# Patient Record
Sex: FEMALE | Race: WHITE | NOT HISPANIC OR LATINO | Employment: FULL TIME | URBAN - METROPOLITAN AREA
[De-identification: names, ages, dates, MRNs, and addresses within clinical notes are randomized per-mention and may not be internally consistent; named-entity substitution may affect disease eponyms.]

---

## 2022-05-27 ENCOUNTER — HOSPITAL ENCOUNTER (EMERGENCY)
Facility: HOSPITAL | Age: 42
Discharge: HOME | End: 2022-05-28
Attending: EMERGENCY MEDICINE
Payer: COMMERCIAL

## 2022-05-27 DIAGNOSIS — D72.829 LEUKOCYTOSIS, UNSPECIFIED TYPE: ICD-10-CM

## 2022-05-27 DIAGNOSIS — R10.9 LEFT SIDED ABDOMINAL PAIN: Primary | ICD-10-CM

## 2022-05-27 LAB
ALBUMIN SERPL BCP-MCNC: 3.4 G/DL (ref 3.5–5.2)
ALP SERPL-CCNC: 70 U/L (ref 55–135)
ALT SERPL W/O P-5'-P-CCNC: 12 U/L (ref 10–44)
ANION GAP SERPL CALC-SCNC: 9 MMOL/L (ref 8–16)
AST SERPL-CCNC: 11 U/L (ref 10–40)
B-HCG UR QL: NEGATIVE
BASOPHILS # BLD AUTO: 0.05 K/UL (ref 0–0.2)
BASOPHILS NFR BLD: 0.3 % (ref 0–1.9)
BILIRUB SERPL-MCNC: 0.8 MG/DL (ref 0.1–1)
BILIRUB UR QL STRIP: NEGATIVE
BUN SERPL-MCNC: 9 MG/DL (ref 6–20)
CALCIUM SERPL-MCNC: 9 MG/DL (ref 8.7–10.5)
CHLORIDE SERPL-SCNC: 104 MMOL/L (ref 95–110)
CLARITY UR: CLEAR
CO2 SERPL-SCNC: 25 MMOL/L (ref 23–29)
COLOR UR: YELLOW
CREAT SERPL-MCNC: 0.7 MG/DL (ref 0.5–1.4)
DIFFERENTIAL METHOD: ABNORMAL
EOSINOPHIL # BLD AUTO: 0.1 K/UL (ref 0–0.5)
EOSINOPHIL NFR BLD: 0.6 % (ref 0–8)
ERYTHROCYTE [DISTWIDTH] IN BLOOD BY AUTOMATED COUNT: 13.8 % (ref 11.5–14.5)
EST. GFR  (AFRICAN AMERICAN): >60 ML/MIN/1.73 M^2
EST. GFR  (NON AFRICAN AMERICAN): >60 ML/MIN/1.73 M^2
GLUCOSE SERPL-MCNC: 116 MG/DL (ref 70–110)
GLUCOSE UR QL STRIP: NEGATIVE
HCT VFR BLD AUTO: 41.4 % (ref 37–48.5)
HGB BLD-MCNC: 13.7 G/DL (ref 12–16)
HGB UR QL STRIP: NEGATIVE
IMM GRANULOCYTES # BLD AUTO: 0.07 K/UL (ref 0–0.04)
IMM GRANULOCYTES NFR BLD AUTO: 0.4 % (ref 0–0.5)
KETONES UR QL STRIP: NEGATIVE
LACTATE SERPL-SCNC: 1.1 MMOL/L (ref 0.5–2.2)
LEUKOCYTE ESTERASE UR QL STRIP: NEGATIVE
LIPASE SERPL-CCNC: 25 U/L (ref 4–60)
LYMPHOCYTES # BLD AUTO: 2.1 K/UL (ref 1–4.8)
LYMPHOCYTES NFR BLD: 12.7 % (ref 18–48)
MCH RBC QN AUTO: 29.1 PG (ref 27–31)
MCHC RBC AUTO-ENTMCNC: 33.1 G/DL (ref 32–36)
MCV RBC AUTO: 88 FL (ref 82–98)
MONOCYTES # BLD AUTO: 2 K/UL (ref 0.3–1)
MONOCYTES NFR BLD: 12.2 % (ref 4–15)
NEUTROPHILS # BLD AUTO: 12.1 K/UL (ref 1.8–7.7)
NEUTROPHILS NFR BLD: 73.8 % (ref 38–73)
NITRITE UR QL STRIP: NEGATIVE
NRBC BLD-RTO: 0 /100 WBC
PH UR STRIP: 8 [PH] (ref 5–8)
PLATELET # BLD AUTO: 278 K/UL (ref 150–450)
PMV BLD AUTO: 9.5 FL (ref 9.2–12.9)
POTASSIUM SERPL-SCNC: 3.7 MMOL/L (ref 3.5–5.1)
PROT SERPL-MCNC: 7.4 G/DL (ref 6–8.4)
PROT UR QL STRIP: NEGATIVE
RBC # BLD AUTO: 4.7 M/UL (ref 4–5.4)
SODIUM SERPL-SCNC: 138 MMOL/L (ref 136–145)
SP GR UR STRIP: 1.01 (ref 1–1.03)
URN SPEC COLLECT METH UR: NORMAL
UROBILINOGEN UR STRIP-ACNC: NEGATIVE EU/DL
WBC # BLD AUTO: 16.35 K/UL (ref 3.9–12.7)

## 2022-05-27 PROCEDURE — 25000003 PHARM REV CODE 250: Performed by: EMERGENCY MEDICINE

## 2022-05-27 PROCEDURE — 85025 COMPLETE CBC W/AUTO DIFF WBC: CPT | Performed by: NURSE PRACTITIONER

## 2022-05-27 PROCEDURE — 63600175 PHARM REV CODE 636 W HCPCS: Performed by: EMERGENCY MEDICINE

## 2022-05-27 PROCEDURE — 83605 ASSAY OF LACTIC ACID: CPT | Performed by: EMERGENCY MEDICINE

## 2022-05-27 PROCEDURE — 96375 TX/PRO/DX INJ NEW DRUG ADDON: CPT

## 2022-05-27 PROCEDURE — 81003 URINALYSIS AUTO W/O SCOPE: CPT | Performed by: NURSE PRACTITIONER

## 2022-05-27 PROCEDURE — 99285 EMERGENCY DEPT VISIT HI MDM: CPT | Mod: 25

## 2022-05-27 PROCEDURE — 81025 URINE PREGNANCY TEST: CPT | Performed by: NURSE PRACTITIONER

## 2022-05-27 PROCEDURE — 83690 ASSAY OF LIPASE: CPT | Performed by: NURSE PRACTITIONER

## 2022-05-27 PROCEDURE — 96361 HYDRATE IV INFUSION ADD-ON: CPT

## 2022-05-27 PROCEDURE — 96374 THER/PROPH/DIAG INJ IV PUSH: CPT

## 2022-05-27 PROCEDURE — 80053 COMPREHEN METABOLIC PANEL: CPT | Performed by: NURSE PRACTITIONER

## 2022-05-27 RX ORDER — LIDOCAINE HYDROCHLORIDE 20 MG/ML
10 SOLUTION OROPHARYNGEAL ONCE
Status: COMPLETED | OUTPATIENT
Start: 2022-05-27 | End: 2022-05-27

## 2022-05-27 RX ORDER — MORPHINE SULFATE 4 MG/ML
4 INJECTION, SOLUTION INTRAMUSCULAR; INTRAVENOUS
Status: COMPLETED | OUTPATIENT
Start: 2022-05-27 | End: 2022-05-27

## 2022-05-27 RX ORDER — ONDANSETRON 2 MG/ML
4 INJECTION INTRAMUSCULAR; INTRAVENOUS
Status: COMPLETED | OUTPATIENT
Start: 2022-05-27 | End: 2022-05-27

## 2022-05-27 RX ORDER — MAG HYDROX/ALUMINUM HYD/SIMETH 200-200-20
30 SUSPENSION, ORAL (FINAL DOSE FORM) ORAL ONCE
Status: COMPLETED | OUTPATIENT
Start: 2022-05-27 | End: 2022-05-27

## 2022-05-27 RX ADMIN — MORPHINE SULFATE 4 MG: 4 INJECTION INTRAVENOUS at 09:05

## 2022-05-27 RX ADMIN — ONDANSETRON 4 MG: 2 INJECTION INTRAMUSCULAR; INTRAVENOUS at 09:05

## 2022-05-27 RX ADMIN — LIDOCAINE HYDROCHLORIDE 10 ML: 20 SOLUTION ORAL; TOPICAL at 10:05

## 2022-05-27 RX ADMIN — SODIUM CHLORIDE 1000 ML: 0.9 INJECTION, SOLUTION INTRAVENOUS at 09:05

## 2022-05-27 RX ADMIN — ALUMINUM HYDROXIDE, MAGNESIUM HYDROXIDE, AND SIMETHICONE 30 ML: 200; 200; 20 SUSPENSION ORAL at 10:05

## 2022-05-28 VITALS
RESPIRATION RATE: 22 BRPM | OXYGEN SATURATION: 99 % | WEIGHT: 293 LBS | DIASTOLIC BLOOD PRESSURE: 84 MMHG | HEART RATE: 91 BPM | BODY MASS INDEX: 45.99 KG/M2 | SYSTOLIC BLOOD PRESSURE: 128 MMHG | HEIGHT: 67 IN | TEMPERATURE: 99 F

## 2022-05-28 PROBLEM — R10.30 LOWER ABDOMINAL PAIN: Status: ACTIVE | Noted: 2022-05-28

## 2022-05-28 LAB
BACTERIA GENITAL QL WET PREP: ABNORMAL
CLUE CELLS VAG QL WET PREP: ABNORMAL
FILAMENT FUNGI VAG WET PREP-#/AREA: ABNORMAL
SPECIMEN SOURCE: ABNORMAL
T VAGINALIS GENITAL QL WET PREP: ABNORMAL
WBC #/AREA VAG WET PREP: ABNORMAL
YEAST GENITAL QL WET PREP: ABNORMAL

## 2022-05-28 PROCEDURE — 87210 SMEAR WET MOUNT SALINE/INK: CPT | Performed by: EMERGENCY MEDICINE

## 2022-05-28 PROCEDURE — 87591 N.GONORRHOEAE DNA AMP PROB: CPT | Performed by: EMERGENCY MEDICINE

## 2022-05-28 PROCEDURE — 96376 TX/PRO/DX INJ SAME DRUG ADON: CPT

## 2022-05-28 PROCEDURE — 99202 OFFICE O/P NEW SF 15 MIN: CPT | Mod: ,,, | Performed by: OBSTETRICS & GYNECOLOGY

## 2022-05-28 PROCEDURE — 99202 PR OFFICE/OUTPT VISIT, NEW, LEVL II, 15-29 MIN: ICD-10-PCS | Mod: ,,, | Performed by: OBSTETRICS & GYNECOLOGY

## 2022-05-28 PROCEDURE — 87491 CHLMYD TRACH DNA AMP PROBE: CPT | Performed by: EMERGENCY MEDICINE

## 2022-05-28 PROCEDURE — 63600175 PHARM REV CODE 636 W HCPCS: Performed by: EMERGENCY MEDICINE

## 2022-05-28 PROCEDURE — 25000003 PHARM REV CODE 250: Performed by: EMERGENCY MEDICINE

## 2022-05-28 RX ORDER — METRONIDAZOLE 500 MG/1
500 TABLET ORAL
Status: COMPLETED | OUTPATIENT
Start: 2022-05-28 | End: 2022-05-28

## 2022-05-28 RX ORDER — AMOXICILLIN AND CLAVULANATE POTASSIUM 875; 125 MG/1; MG/1
1 TABLET, FILM COATED ORAL EVERY 8 HOURS
Qty: 21 TABLET | Refills: 0 | Status: SHIPPED | OUTPATIENT
Start: 2022-05-28 | End: 2022-06-04

## 2022-05-28 RX ORDER — METRONIDAZOLE 500 MG/1
500 TABLET ORAL 3 TIMES DAILY
Qty: 21 TABLET | Refills: 0 | Status: SHIPPED | OUTPATIENT
Start: 2022-05-28 | End: 2022-06-04

## 2022-05-28 RX ORDER — HYDROCODONE BITARTRATE AND ACETAMINOPHEN 7.5; 325 MG/1; MG/1
1 TABLET ORAL EVERY 6 HOURS PRN
Qty: 12 TABLET | Refills: 0 | Status: SHIPPED | OUTPATIENT
Start: 2022-05-28 | End: 2022-10-04

## 2022-05-28 RX ORDER — ONDANSETRON 4 MG/1
4 TABLET, ORALLY DISINTEGRATING ORAL EVERY 6 HOURS PRN
Qty: 15 TABLET | Refills: 0 | Status: SHIPPED | OUTPATIENT
Start: 2022-05-28 | End: 2022-10-04

## 2022-05-28 RX ORDER — MORPHINE SULFATE 4 MG/ML
6 INJECTION, SOLUTION INTRAMUSCULAR; INTRAVENOUS
Status: COMPLETED | OUTPATIENT
Start: 2022-05-28 | End: 2022-05-28

## 2022-05-28 RX ORDER — ONDANSETRON 2 MG/ML
4 INJECTION INTRAMUSCULAR; INTRAVENOUS
Status: COMPLETED | OUTPATIENT
Start: 2022-05-28 | End: 2022-05-28

## 2022-05-28 RX ORDER — AMOXICILLIN AND CLAVULANATE POTASSIUM 875; 125 MG/1; MG/1
1 TABLET, FILM COATED ORAL
Status: COMPLETED | OUTPATIENT
Start: 2022-05-28 | End: 2022-05-28

## 2022-05-28 RX ADMIN — AMOXICILLIN AND CLAVULANATE POTASSIUM 1 TABLET: 875; 125 TABLET, FILM COATED ORAL at 01:05

## 2022-05-28 RX ADMIN — MORPHINE SULFATE 6 MG: 4 INJECTION INTRAVENOUS at 12:05

## 2022-05-28 RX ADMIN — ONDANSETRON 4 MG: 2 INJECTION INTRAMUSCULAR; INTRAVENOUS at 12:05

## 2022-05-28 RX ADMIN — METRONIDAZOLE 500 MG: 500 TABLET ORAL at 01:05

## 2022-05-28 NOTE — ED PROVIDER NOTES
"SCRIBE #1 NOTE: I, Torey Zofia, am scribing for, and in the presence of, Tray Zuñiga MD. I have scribed the entire note.       History     Chief Complaint   Patient presents with    Abdominal Pain     Pain to left flank, across pelvis; pt seen at after hours clinic and was sent here.  Pt had sore throat last week and was prescribed steroid; her throat pain has improved since then.       Review of patient's allergies indicates:   Allergen Reactions    Levaquin [levofloxacin]     Phenergan [promethazine]          History of Present Illness     HPI    5/27/2022, 8:58 PM  History obtained from the patient      History of Present Illness: Tricia Pierre is a 41 y.o. female patient with a PMHx of an MI in 2017 who presents to the Emergency Department for evaluation of LUQ, LLQ abdominal pain and pelvic pain which onset gradually 1 day ago. Pt states she had a mild sore throat and fever that caused her to see her PCP on 05/19/2022 where she tested negative for flu and COVID-19. She reports she was given steroids that provided relief to her sore throat and she treated the episodic fever with ibuprofen. She states her abdominal pain is categorized by fatigue and nausea and described as "sore". She mentions having a fever today at 1100 with a Tmax of 102.6 that resolved with ibuprofen. Symptoms are constant and moderate in severity. No mitigating or exacerbating factors reported. No associated sxs reported. Patient denies any vomiting, diarrhea, dysuria, vaginal bleeding, vaginal discharge, SOB, cough, and all other sxs at this time. No other prior Tx reported. No further complaints or concerns at this time.       Arrival mode: Personal vehicle    PCP: Primary Doctor No        Past Medical History:  No past medical history on file.    Past Surgical History:  No past surgical history on file.      Family History:  No family history on file.    Social History:  Social History     Tobacco Use    Smoking status: Not on file    " Smokeless tobacco: Not on file   Substance and Sexual Activity    Alcohol use: Not on file    Drug use: Not on file    Sexual activity: Not on file        Review of Systems     Review of Systems   Constitutional: Positive for fatigue. Negative for fever.   HENT: Positive for sore throat (Mild).    Respiratory: Negative for cough and shortness of breath.    Cardiovascular: Negative for chest pain.   Gastrointestinal: Positive for abdominal pain (sore) and nausea. Negative for diarrhea and vomiting.   Genitourinary: Positive for pelvic pain. Negative for dysuria, vaginal bleeding and vaginal discharge.   Musculoskeletal: Negative for back pain.   Skin: Negative for rash.   Neurological: Negative for dizziness, weakness and headaches.   Hematological: Does not bruise/bleed easily.   All other systems reviewed and are negative.     Physical Exam     Initial Vitals [05/27/22 1902]   BP Pulse Resp Temp SpO2   (!) 176/86 (!) 113 18 99.9 °F (37.7 °C) 97 %      MAP       --          Physical Exam  Nursing Notes and Vital Signs Reviewed.  Constitutional: Patient is in no acute distress. Increased BMI  Head: Atraumatic. Normocephalic.  Eyes: PERRL. EOM intact. Conjunctivae are not pale. No scleral icterus.  ENT: Mucous membranes are dry. Oropharynx is clear and symmetric.    Neck: Supple. Full ROM. No lymphadenopathy.  Cardiovascular: Tachycardiac. Regular rhythm. No murmurs, rubs, or gallops. Distal pulses are 2+ and symmetric.  Pulmonary/Chest: No respiratory distress. Clear to auscultation bilaterally. No wheezing or rales.  Abdominal: Soft and non-distended.  There is bilateral lower tenderness L>R.  No rebound, guarding, or rigidity.  Pelvic: A female chaperone was present for this examination. Nl external inspection. No lesions or abnormalities were visible on the labia majora or minora. Cervical os is closed. There is no cervical motion tenderness. There is no blood in the vaginal vault. Minimal white discharge  "present. Left adnexal tenderness present. No right adnexal tenderness. No adnexal masses.  Genitourinary: No CVA tenderness  Musculoskeletal: Moves all extremities. No obvious deformities. No edema. No calf tenderness.  Skin: Warm and dry.  Neurological:  Alert, awake, and appropriate.  Normal speech.  No acute focal neurological deficits are appreciated.  Psychiatric: Normal affect. Good eye contact. Appropriate in content.     ED Course   Procedures  ED Vital Signs:  Vitals:    05/27/22 1902 05/27/22 2002 05/27/22 2102 05/27/22 2105   BP: (!) 176/86 (!) 122/58 135/78    Pulse: (!) 113 105 102    Resp: 18   18   Temp: 99.9 °F (37.7 °C)      TempSrc: Oral      SpO2: 97% 97% 96%    Weight: (!) 138.7 kg (305 lb 12.5 oz)      Height: 5' 7" (1.702 m)       05/27/22 2119 05/27/22 2346 05/28/22 0018   BP:  128/84    Pulse:  91    Resp:  19 (!) 22   Temp: 98.6 °F (37 °C)     TempSrc: Oral     SpO2:  99%    Weight:      Height:          Abnormal Lab Results:  Labs Reviewed   VAGINAL SCREEN - Abnormal; Notable for the following components:       Result Value    Clue Cells Rare (*)     WBC - Vaginal Screen Rare (*)     Bacteria - Vaginal Screen Occasional (*)     All other components within normal limits    Narrative:     Release to patient->Immediate   CBC W/ AUTO DIFFERENTIAL - Abnormal; Notable for the following components:    WBC 16.35 (*)     Gran # (ANC) 12.1 (*)     Immature Grans (Abs) 0.07 (*)     Mono # 2.0 (*)     Gran % 73.8 (*)     Lymph % 12.7 (*)     All other components within normal limits   COMPREHENSIVE METABOLIC PANEL - Abnormal; Notable for the following components:    Glucose 116 (*)     Albumin 3.4 (*)     All other components within normal limits   C. TRACHOMATIS/N. GONORRHOEAE BY AMP DNA   LIPASE   URINALYSIS, REFLEX TO URINE CULTURE    Narrative:     Specimen Source->Urine   PREGNANCY TEST, URINE RAPID    Narrative:     Specimen Source->Urine   LACTIC ACID, PLASMA        All Lab Results:  Results for " orders placed or performed during the hospital encounter of 05/27/22   Vaginal Screen    Specimen: Vagina   Result Value Ref Range    Trichomonas None None    Clue Cells Rare (A) None    Budding Yeast None None    Fungal Hyphae None None    WBC - Vaginal Screen Rare (A) None    Bacteria - Vaginal Screen Occasional (A) None    Wet Prep Source Vagina    CBC auto differential   Result Value Ref Range    WBC 16.35 (H) 3.90 - 12.70 K/uL    RBC 4.70 4.00 - 5.40 M/uL    Hemoglobin 13.7 12.0 - 16.0 g/dL    Hematocrit 41.4 37.0 - 48.5 %    MCV 88 82 - 98 fL    MCH 29.1 27.0 - 31.0 pg    MCHC 33.1 32.0 - 36.0 g/dL    RDW 13.8 11.5 - 14.5 %    Platelets 278 150 - 450 K/uL    MPV 9.5 9.2 - 12.9 fL    Immature Granulocytes 0.4 0.0 - 0.5 %    Gran # (ANC) 12.1 (H) 1.8 - 7.7 K/uL    Immature Grans (Abs) 0.07 (H) 0.00 - 0.04 K/uL    Lymph # 2.1 1.0 - 4.8 K/uL    Mono # 2.0 (H) 0.3 - 1.0 K/uL    Eos # 0.1 0.0 - 0.5 K/uL    Baso # 0.05 0.00 - 0.20 K/uL    nRBC 0 0 /100 WBC    Gran % 73.8 (H) 38.0 - 73.0 %    Lymph % 12.7 (L) 18.0 - 48.0 %    Mono % 12.2 4.0 - 15.0 %    Eosinophil % 0.6 0.0 - 8.0 %    Basophil % 0.3 0.0 - 1.9 %    Differential Method Automated    Comprehensive metabolic panel   Result Value Ref Range    Sodium 138 136 - 145 mmol/L    Potassium 3.7 3.5 - 5.1 mmol/L    Chloride 104 95 - 110 mmol/L    CO2 25 23 - 29 mmol/L    Glucose 116 (H) 70 - 110 mg/dL    BUN 9 6 - 20 mg/dL    Creatinine 0.7 0.5 - 1.4 mg/dL    Calcium 9.0 8.7 - 10.5 mg/dL    Total Protein 7.4 6.0 - 8.4 g/dL    Albumin 3.4 (L) 3.5 - 5.2 g/dL    Total Bilirubin 0.8 0.1 - 1.0 mg/dL    Alkaline Phosphatase 70 55 - 135 U/L    AST 11 10 - 40 U/L    ALT 12 10 - 44 U/L    Anion Gap 9 8 - 16 mmol/L    eGFR if African American >60 >60 mL/min/1.73 m^2    eGFR if non African American >60 >60 mL/min/1.73 m^2   Lipase   Result Value Ref Range    Lipase 25 4 - 60 U/L   Urinalysis, Reflex to Urine Culture Urine, Clean Catch    Specimen: Urine   Result Value Ref  Range    Specimen UA Urine, Clean Catch     Color, UA Yellow Yellow, Straw, Araceli    Appearance, UA Clear Clear    pH, UA 8.0 5.0 - 8.0    Specific Gravity, UA 1.015 1.005 - 1.030    Protein, UA Negative Negative    Glucose, UA Negative Negative    Ketones, UA Negative Negative    Bilirubin (UA) Negative Negative    Occult Blood UA Negative Negative    Nitrite, UA Negative Negative    Urobilinogen, UA Negative <2.0 EU/dL    Leukocytes, UA Negative Negative   Pregnancy, urine rapid   Result Value Ref Range    Preg Test, Ur Negative    Lactic acid, plasma   Result Value Ref Range    Lactate (Lactic Acid) 1.1 0.5 - 2.2 mmol/L         Imaging Results:  Imaging Results          US Pelvis Comp with Transvag NON-OB (xpd) (Final result)  Result time 05/27/22 23:09:42   Procedure changed from US Pelvis Complete Non OB     Final result by William Bledsoe MD (05/27/22 23:09:42)                 Impression:      Limited examination due to body habitus.  Fluid in the endometrial canal.    The ovaries were only seen on trans abdominal exam and poorly evaluated due to body habitus.  Color flow could not be documented on the left ovary due to suboptimal imaging and body habitus      Electronically signed by: Rakan Thomas  Date:    05/27/2022  Time:    23:09             Narrative:    EXAMINATION:  US PELVIS COMP WITH TRANSVAG NON-OB (XPD)    CLINICAL HISTORY:  bilateral lower abdominal pain;    TECHNIQUE:  Transabdominal and transvaginal pelvic ultrasound performed.    FINDINGS:  The uterus measures 13.3 x 4.4 x 6.9 cm.  The endometrial stripe measures 4 mm.  Poor study secondary to patient's body size.  Fluid in the cervical canal.  The right ovary measures 4.1 x 3.3 x 3.7 cm and poorly seen but grossly unremarkable.  Vascular flow was seen in the right ovary.    Left ovary measures 3.8 x 2.5 x 2.3 cm in poorly seen.  Evaluation of vascular flow limited by body habitus.    There is no free pelvic fluid.                                CT Abdomen Pelvis  Without Contrast (Final result)  Result time 05/27/22 21:08:40    Final result by William Bledsoe MD (05/27/22 21:08:40)                 Impression:      No acute process.    All CT scans   are performed using dose optimization techniques including the following: automated exposure control; adjustment of the mA and/or kV; use of iterative reconstruction technique.  Dose modulation was employed for ALARA by means of: Automated exposure control; adjustment of the mA and/or kV according to patient size (this includes techniques or standardized protocols for targeted exams where dose is matched to indication/reason for exam; i.e. extremities or head); and/or use of iterative reconstructive technique.      Electronically signed by: Rakan Thomas  Date:    05/27/2022  Time:    21:08             Narrative:    EXAMINATION:  CT ABDOMEN PELVIS WITHOUT CONTRAST    CLINICAL HISTORY:  LLQ abdominal pain;Abdominal pain, acute, nonlocalized;    TECHNIQUE:  Low dose axial images, sagittal and coronal reformations were obtained from the lung bases to the pubic symphysis, Oral contrast was not administered.    COMPARISON:  None    FINDINGS:  Heart: Normal in size as far as seen.  No pericardial effusion as far seen.    Lung Bases: Well aerated, without consolidation or pleural fluid.    Liver: Normal in size and attenuation, with no focal hepatic lesions.    Gallbladder: Status post cholecystectomy.    Bile Ducts: No evidence of dilated ducts.    Pancreas: No mass or peripancreatic fat stranding.    Spleen: Unremarkable.    Adrenals: Unremarkable.    Kidneys/ Ureters: Unremarkable.    Bladder: No evidence of wall thickening.    Reproductive organs: Unremarkable.    GI Tract/Mesentery: No evidence of bowel obstruction or inflammation. No secondary signs of appendicitis.  Submucosal low attenuation in the rectosigmoid colon may relate to old colonic segmental colitis.    Peritoneal Space: No ascites. No free  air.    Retroperitoneum: No significant adenopathy.    Abdominal wall: Small fat containing umbilical hernia.    Vasculature: No aneurysm.    Bones: No acute fracture.                                 The Emergency Provider reviewed the vital signs and test results, which are outlined above.     ED Discussion       1:27 AM: Reassessed pt at this time. Discussed with pt all pertinent ED information and results. Discussed pt dx and plan of tx. Gave pt all f/u and return to the ED instructions. All questions and concerns were addressed at this time. Pt expresses understanding of information and instructions, and is comfortable with plan to discharge. Pt is stable for discharge.    I discussed with patient and/or family/caretaker that evaluation in the ED does not suggest any emergent or life threatening medical conditions requiring immediate intervention beyond what was provided in the ED, and I believe patient is safe for discharge.  Regardless, an unremarkable evaluation in the ED does not preclude the development or presence of a serious of life threatening condition. As such, patient was instructed to return immediately for any worsening or change in current symptoms.      ED Course as of 05/28/22 0217   Fri May 27, 2022   2151 Abdominal CT read as within normal limits.  Abdominal CT had been ordered upfront without contrast due to the severe IV contrast shortage at this time.  I discussed with the radiologist Dr. Thomas.  He reviewed the CT scan again.  I inquired if a repeat CT scan with the IV contrast would help visualize something that could have been missed particularly diverticulitis, but Dr. thomas reviewed and thought that there be no added benefit to repeating CT scan with IV contrast. [DP]   Sat May 28, 2022   0046 OBGYN Dr. Sorensen evaluated patient at bedside and does not suspect ovarian torsion.  States that she will write a note that the patient is cleared from an OBGYN ovarian torsion standpoint [DP]       ED Course User Index  [DP] Tray Zuñiga MD     Medical Decision Making:   Clinical Tests:   Lab Tests: Ordered and Reviewed  Radiological Study: Ordered and Reviewed  Left-sided abdominal pain with leukocytosis.  Of note patient was recently on a prescription steroids, however she did report a fever at home.  Due to the worldwide IV contrast shortage, CT was ordered upfront without IV contrast.  No evidence of diverticulitis or other acute abnormalities.  OBGYN evaluated patient and cleared patient from a ovarian torsion standpoint.  Diverticulitis was high in the differential initially.  With no clear definitive diagnosis, I Will treat patient with diverticulitis antibiotics given her a sided abdominal pain with leukocytosis and reports of fever.  Advised very close follow-up on Monday morning.  ER return precautions provided for any new worsening symptoms.           ED Medication(s):  Medications   sodium chloride 0.9% bolus 1,000 mL (0 mLs Intravenous Stopped 5/27/22 2301)   ondansetron injection 4 mg (4 mg Intravenous Given 5/27/22 2105)   morphine injection 4 mg (4 mg Intravenous Given 5/27/22 2105)   aluminum-magnesium hydroxide-simethicone 200-200-20 mg/5 mL suspension 30 mL (30 mLs Oral Given 5/27/22 2228)     And   LIDOcaine HCl 2% oral solution 10 mL (10 mLs Oral Given 5/27/22 2229)   morphine injection 6 mg (6 mg Intravenous Given 5/28/22 0018)   ondansetron injection 4 mg (4 mg Intravenous Given 5/28/22 0018)   metroNIDAZOLE tablet 500 mg (500 mg Oral Given 5/28/22 0145)   amoxicillin-clavulanate 875-125mg per tablet 1 tablet (1 tablet Oral Given 5/28/22 0145)       Discharge Medication List as of 5/28/2022  1:36 AM      START taking these medications    Details   amoxicillin-clavulanate 875-125mg (AUGMENTIN) 875-125 mg per tablet Take 1 tablet by mouth every 8 (eight) hours. for 7 days, Starting Sat 5/28/2022, Until Sat 6/4/2022, Print      HYDROcodone-acetaminophen (NORCO) 7.5-325 mg per tablet  Take 1 tablet by mouth every 6 (six) hours as needed for Pain., Starting Sat 5/28/2022, Print      metroNIDAZOLE (FLAGYL) 500 MG tablet Take 1 tablet (500 mg total) by mouth 3 (three) times daily. for 7 days, Starting Sat 5/28/2022, Until Sat 6/4/2022, Print      ondansetron (ZOFRAN-ODT) 4 MG TbDL Take 1 tablet (4 mg total) by mouth every 6 (six) hours as needed (nausea)., Starting Sat 5/28/2022, Print              Follow-up Information     [facility] - Emergency Dept..    Specialty: Emergency Medicine  Why: As needed, If symptoms worsen  Contact information:  [address] [facility] [address] [phone]           Follow-up with primary care physician in 2 days.                           Scribe Attestation:   Scribe #1: I performed the above scribed service and the documentation accurately describes the services I performed. I attest to the accuracy of the note.     Attending:   Physician Attestation Statement for Scribe #1: I, Tray Zuñiga MD, personally performed the services described in this documentation, as scribed by Torey Armijo, in my presence, and it is both accurate and complete.           Clinical Impression       ICD-10-CM ICD-9-CM   1. Left sided abdominal pain  R10.9 789.09   2. Leukocytosis, unspecified type  D72.829 288.60       Disposition:   Disposition: Discharged  Condition: Stable         Tray Zuñiga MD  05/28/22 0219

## 2022-05-28 NOTE — HPI
Asked to see pt for pelvic pain  40 y/o reports chronic history of lower pelvic pain; reports current pain lower abdomen for  Past 2 days.  Patient describes pain at bottom of abdomen  Hx of tl; last normal menses 2 wks ago;

## 2022-05-28 NOTE — ASSESSMENT & PLAN NOTE
bilat ovaries normal in size--low probability of torsion  Reassurance given regarding normal sized ovaries  Also if torsing for 2 days; there would not be blood flow to ovary and ovary   Would be edematous and enlarged  Also patient does not have surgical abdomen  Findings discussed with Dr. Zuñiga

## 2022-05-28 NOTE — CONSULTS
[facility] - Emergency Dept.  Obstetrics & Gynecology  Consult Note    Patient Name: Tricia Pierre  MRN: [id number]  Admission Date: 5/27/2022  Hospital Length of Stay: 0 days  Code Status: No Order  Primary Care Provider: Primary Doctor No  Principal Problem: <principal problem not specified>    Consults  Subjective:     Chief Complaint: abdominal pain    History of Present Illness:  Asked to see pt for pelvic pain  42 y/o reports chronic history of lower pelvic pain; reports current pain lower abdomen for  Past 2 days.  Patient describes pain at bottom of abdomen  Hx of tl; last normal menses 2 wks ago;           OB History   No obstetric history on file.     No past medical history on file.  No past surgical history on file.    (Not in a hospital admission)      Review of patient's allergies indicates:   Allergen Reactions    Levaquin [levofloxacin]     Phenergan [promethazine]         Family History    None       Tobacco Use    Smoking status: Not on file    Smokeless tobacco: Not on file   Substance and Sexual Activity    Alcohol use: Not on file    Drug use: Not on file    Sexual activity: Not on file     Review of Systems   Gastrointestinal:  Positive for abdominal pain, constipation and diarrhea.   Genitourinary:  Positive for pelvic pain.   All other systems reviewed and are negative.   Objective:     Vital Signs (Most Recent):  Temp: 98.6 °F (37 °C) (05/27/22 2119)  Pulse: 91 (05/27/22 2346)  Resp: (!) 22 (05/28/22 0018)  BP: 128/84 (05/27/22 2346)  SpO2: 99 % (05/27/22 2346)   Vital Signs (24h Range):  Temp:  [98.6 °F (37 °C)-99.9 °F (37.7 °C)] 98.6 °F (37 °C)  Pulse:  [] 91  Resp:  [18-22] 22  SpO2:  [96 %-99 %] 99 %  BP: (122-176)/(58-86) 128/84     Weight: (!) 138.7 kg (305 lb 12.5 oz)  Body mass index is 47.89 kg/m².    No LMP recorded.    Physical Exam:   Constitutional: She appears well-developed.     Eyes: Pupils are equal, round, and reactive to light. Conjunctivae and EOM are normal.       Pulmonary/Chest: Effort normal. Right breast exhibits no mass, no nipple discharge, no skin change, no tenderness and presence. Left breast exhibits no mass, no nipple discharge, no skin change, no tenderness and presence. Breasts are symmetrical.        Abdominal: Soft.     Genitourinary:    Vagina and uterus normal.      Pelvic exam was performed with patient supine.   Labial bartholins normal.          Musculoskeletal: Normal range of motion.       Neurological: She is alert.    Skin: Skin is warm.    Psychiatric: She has a normal mood and affect.   No cmt, no adnexa tenderness,   Abdomen--No rebound, no guarding  Laboratory:  Recent Lab Results         05/28/22  0015   05/27/22 2106 05/27/22 2024 05/27/22 2016        Albumin     3.4         Alkaline Phosphatase     70         ALT     12         Anion Gap     9         Appearance, UA       Clear       AST     11         Bacteria - Vaginal Screen Occasional             Baso #     0.05         Basophil %     0.3         Bilirubin (UA)       Negative       BILIRUBIN TOTAL     0.8  Comment: For infants and newborns, interpretation of results should be based  on gestational age, weight and in agreement with clinical  observations.    Premature Infant recommended reference ranges:  Up to 24 hours.............<8.0 mg/dL  Up to 48 hours............<12.0 mg/dL  3-5 days..................<15.0 mg/dL  6-29 days.................<15.0 mg/dL           Budding Yeast None             BUN     9         Calcium     9.0         Chloride     104         Clue Cells, Wet Prep Rare             CO2     25         Color, UA       Yellow       Creatinine     0.7         Differential Method     Automated         eGFR if      >60         eGFR if non      >60  Comment: Calculation used to obtain the estimated glomerular filtration  rate (eGFR) is the CKD-EPI equation.            Eos #     0.1         Eosinophil %     0.6         Fungal Hyphae None              Glucose     116         Glucose, UA       Negative       Gran # (ANC)     12.1         Gran %     73.8         Hematocrit     41.4         Hemoglobin     13.7         Immature Grans (Abs)     0.07  Comment: Mild elevation in immature granulocytes is non specific and   can be seen in a variety of conditions including stress response,   acute inflammation, trauma and pregnancy. Correlation with other   laboratory and clinical findings is essential.           Immature Granulocytes     0.4         Ketones, UA       Negative       Lactate, Hany   1.1  Comment: Falsely low lactic acid results can be found in samples   containing >=13.0 mg/dL total bilirubin and/or >=3.5 mg/dL   direct bilirubin.             Leukocytes, UA       Negative       Lipase     25         Lymph #     2.1         Lymph %     12.7         MCH     29.1         MCHC     33.1         MCV     88         Mono #     2.0         Mono %     12.2         MPV     9.5         NITRITE UA       Negative       nRBC     0         Occult Blood UA       Negative       pH, UA       8.0       Platelets     278         Potassium     3.7         Preg Test, Ur       Negative       PROTEIN TOTAL     7.4         Protein, UA       Negative  Comment: Recommend a 24 hour urine protein or a urine   protein/creatinine ratio if globulin induced proteinuria is  clinically suspected.         RBC     4.70         RDW     13.8         Sodium     138         Specific [place], UA       1.015       Specimen UA       Urine, Clean Catch       Trichomonas None             UROBILINOGEN UA       Negative       WBC - Vaginal Screen Rare             Wet Prep Source Vagina             WBC     16.35               I have personallly reviewed all pertinent lab results from the last 24 hours.    Diagnostic Results:  US: Reviewed  CT: Reviewed    Assessment/Plan:     Lower abdominal pain  bilat ovaries normal in size--low probability of torsion  Reassurance given regarding normal sized  ovaries  Also if torsing for 2 days; there would not be blood flow to ovary and ovary   Would be edematous and enlarged  Also patient does not have surgical abdomen  Findings discussed with Dr. Zuñiga            Thank you for your consult. I will sign off. Please contact us if you have any additional questions.    Twila Sorensen MD  Obstetrics & Gynecology  [facility] - Emergency Dept.

## 2022-05-28 NOTE — SUBJECTIVE & OBJECTIVE
OB History   No obstetric history on file.     No past medical history on file.  No past surgical history on file.    (Not in a hospital admission)      Review of patient's allergies indicates:   Allergen Reactions    Levaquin [levofloxacin]     Phenergan [promethazine]         Family History    None       Tobacco Use    Smoking status: Not on file    Smokeless tobacco: Not on file   Substance and Sexual Activity    Alcohol use: Not on file    Drug use: Not on file    Sexual activity: Not on file     Review of Systems   Gastrointestinal:  Positive for abdominal pain, constipation and diarrhea.   Genitourinary:  Positive for pelvic pain.   All other systems reviewed and are negative.   Objective:     Vital Signs (Most Recent):  Temp: 98.6 °F (37 °C) (05/27/22 2119)  Pulse: 91 (05/27/22 2346)  Resp: (!) 22 (05/28/22 0018)  BP: 128/84 (05/27/22 2346)  SpO2: 99 % (05/27/22 2346)   Vital Signs (24h Range):  Temp:  [98.6 °F (37 °C)-99.9 °F (37.7 °C)] 98.6 °F (37 °C)  Pulse:  [] 91  Resp:  [18-22] 22  SpO2:  [96 %-99 %] 99 %  BP: (122-176)/(58-86) 128/84     Weight: (!) 138.7 kg (305 lb 12.5 oz)  Body mass index is 47.89 kg/m².    No LMP recorded.    Physical Exam:   Constitutional: She appears well-developed.     Eyes: Pupils are equal, round, and reactive to light. Conjunctivae and EOM are normal.      Pulmonary/Chest: Effort normal. Right breast exhibits no mass, no nipple discharge, no skin change, no tenderness and presence. Left breast exhibits no mass, no nipple discharge, no skin change, no tenderness and presence. Breasts are symmetrical.        Abdominal: Soft.     Genitourinary:    Vagina and uterus normal.      Pelvic exam was performed with patient supine.   Labial bartholins normal.          Musculoskeletal: Normal range of motion.       Neurological: She is alert.    Skin: Skin is warm.    Psychiatric: She has a normal mood and affect.   No cmt, no adnexa tenderness,   Abdomen--No rebound, no  [facility]  Laboratory:  Recent Lab Results         05/28/22  0015   05/27/22 2106 05/27/22 2024 05/27/22 2016        Albumin     3.4         Alkaline Phosphatase     70         ALT     12         Anion Gap     9         Appearance, UA       Clear       AST     11         Bacteria - Vaginal Screen Occasional             Baso #     0.05         Basophil %     0.3         Bilirubin (UA)       Negative       BILIRUBIN TOTAL     0.8  Comment: For infants and newborns, interpretation of results should be based  on gestational age, weight and in agreement with clinical  observations.    Premature Infant recommended reference ranges:  Up to 24 hours.............<8.0 mg/dL  Up to 48 hours............<12.0 mg/dL  3-5 days..................<15.0 mg/dL  6-29 days.................<15.0 mg/dL           Budding Yeast None             BUN     9         Calcium     9.0         Chloride     104         Clue Cells, Wet Prep Rare             CO2     25         Color, UA       Yellow       Creatinine     0.7         Differential Method     Automated         eGFR if      >60         eGFR if non      >60  Comment: Calculation used to obtain the estimated glomerular filtration  rate (eGFR) is the CKD-EPI equation.            Eos #     0.1         Eosinophil %     0.6         Fungal Hyphae None             Glucose     116         Glucose, UA       Negative       Gran # (ANC)     12.1         Gran %     73.8         Hematocrit     41.4         Hemoglobin     13.7         Immature Grans (Abs)     0.07  Comment: Mild elevation in immature granulocytes is non specific and   can be seen in a variety of conditions including stress response,   acute inflammation, trauma and pregnancy. Correlation with other   laboratory and clinical findings is essential.           Immature Granulocytes     0.4         Ketones, UA       Negative       Lactate, Hany   1.1  Comment: Falsely low lactic acid results can be found in  samples   containing >=13.0 mg/dL total bilirubin and/or >=3.5 mg/dL   direct bilirubin.             Leukocytes, UA       Negative       Lipase     25         Lymph #     2.1         Lymph %     12.7         MCH     29.1         MCHC     33.1         MCV     88         Mono #     2.0         Mono %     12.2         MPV     9.5         NITRITE UA       Negative       nRBC     0         Occult Blood UA       Negative       pH, UA       8.0       Platelets     278         Potassium     3.7         Preg Test, Ur       Negative       PROTEIN TOTAL     7.4         Protein, UA       Negative  Comment: Recommend a 24 hour urine protein or a urine   protein/creatinine ratio if globulin induced proteinuria is  clinically suspected.         RBC     4.70         RDW     13.8         Sodium     138         Specific Randall, UA       1.015       Specimen UA       Urine, Clean Catch       Trichomonas None             UROBILINOGEN UA       Negative       WBC - Vaginal Screen Rare             Wet Prep Source Vagina             WBC     16.35               I have personallly reviewed all pertinent lab results from the last 24 hours.    Diagnostic Results:  US: Reviewed  CT: Reviewed

## 2022-05-28 NOTE — FIRST PROVIDER EVALUATION
"Medical screening exam completed.  I have conducted a focused provider triage encounter, findings are as follows:    Brief history of present illness:  Patient complains of left-sided abdominal pain radiating over to the midline    Vitals:    05/27/22 1902   BP: (!) 176/86   BP Location: Right arm   Patient Position: Sitting   Pulse: (!) 113   Resp: 18   Temp: 99.9 °F (37.7 °C)  Comment: pt took ibuprofen at 1100 today   TempSrc: Oral   SpO2: 97%   Weight: (!) 138.7 kg (305 lb 12.5 oz)   Height: 5' 7" (1.702 m)       Pertinent physical exam:  Patient appears to be very uncomfortable    Brief workup plan:  Abdominal workup    Preliminary workup initiated; this workup will be continued and followed by the physician or advanced practice provider that is assigned to the patient when roomed.  "

## 2022-05-30 LAB
C TRACH DNA SPEC QL NAA+PROBE: NOT DETECTED
N GONORRHOEA DNA SPEC QL NAA+PROBE: NOT DETECTED

## 2022-10-04 ENCOUNTER — OFFICE VISIT (OUTPATIENT)
Dept: PRIMARY CARE CLINIC | Facility: CLINIC | Age: 42
End: 2022-10-04
Payer: COMMERCIAL

## 2022-10-04 VITALS
SYSTOLIC BLOOD PRESSURE: 132 MMHG | BODY MASS INDEX: 45.99 KG/M2 | OXYGEN SATURATION: 100 % | HEART RATE: 99 BPM | WEIGHT: 293 LBS | HEIGHT: 67 IN | DIASTOLIC BLOOD PRESSURE: 84 MMHG | TEMPERATURE: 98 F

## 2022-10-04 DIAGNOSIS — I25.2 HISTORY OF NON-ST ELEVATION MYOCARDIAL INFARCTION (NSTEMI): ICD-10-CM

## 2022-10-04 DIAGNOSIS — M54.9 DORSALGIA, UNSPECIFIED: Primary | ICD-10-CM

## 2022-10-04 DIAGNOSIS — M46.97 INFLAMMATORY SPONDYLOPATHY OF LUMBOSACRAL REGION: ICD-10-CM

## 2022-10-04 DIAGNOSIS — Z00.00 ENCOUNTER FOR ANNUAL HEALTH EXAMINATION: ICD-10-CM

## 2022-10-04 DIAGNOSIS — Z11.59 ENCOUNTER FOR HEPATITIS C SCREENING TEST FOR LOW RISK PATIENT: ICD-10-CM

## 2022-10-04 DIAGNOSIS — K92.1 HEMATOCHEZIA: ICD-10-CM

## 2022-10-04 DIAGNOSIS — K92.1 MELENA: ICD-10-CM

## 2022-10-04 DIAGNOSIS — Z23 NEED FOR TDAP VACCINATION: ICD-10-CM

## 2022-10-04 DIAGNOSIS — G89.29 CHRONIC BILATERAL LOW BACK PAIN WITHOUT SCIATICA: ICD-10-CM

## 2022-10-04 DIAGNOSIS — G50.0 TRIGEMINAL NEURALGIA: ICD-10-CM

## 2022-10-04 DIAGNOSIS — Z13.220 ENCOUNTER FOR LIPID SCREENING FOR CARDIOVASCULAR DISEASE: ICD-10-CM

## 2022-10-04 DIAGNOSIS — E66.01 MORBID OBESITY: ICD-10-CM

## 2022-10-04 DIAGNOSIS — Z23 INFLUENZA VACCINE NEEDED: ICD-10-CM

## 2022-10-04 DIAGNOSIS — Z13.6 ENCOUNTER FOR LIPID SCREENING FOR CARDIOVASCULAR DISEASE: ICD-10-CM

## 2022-10-04 DIAGNOSIS — M54.50 CHRONIC BILATERAL LOW BACK PAIN WITHOUT SCIATICA: ICD-10-CM

## 2022-10-04 PROBLEM — R10.30 LOWER ABDOMINAL PAIN: Status: RESOLVED | Noted: 2022-05-28 | Resolved: 2022-10-04

## 2022-10-04 PROCEDURE — 90471 FLU VACCINE (QUAD) GREATER THAN OR EQUAL TO 3YO PRESERVATIVE FREE IM: ICD-10-PCS | Mod: S$GLB,,, | Performed by: INTERNAL MEDICINE

## 2022-10-04 PROCEDURE — 3008F BODY MASS INDEX DOCD: CPT | Mod: CPTII,S$GLB,, | Performed by: INTERNAL MEDICINE

## 2022-10-04 PROCEDURE — 3075F PR MOST RECENT SYSTOLIC BLOOD PRESS GE 130-139MM HG: ICD-10-PCS | Mod: CPTII,S$GLB,, | Performed by: INTERNAL MEDICINE

## 2022-10-04 PROCEDURE — 1159F MED LIST DOCD IN RCRD: CPT | Mod: CPTII,S$GLB,, | Performed by: INTERNAL MEDICINE

## 2022-10-04 PROCEDURE — 99204 OFFICE O/P NEW MOD 45 MIN: CPT | Mod: 25,S$GLB,, | Performed by: INTERNAL MEDICINE

## 2022-10-04 PROCEDURE — 1159F PR MEDICATION LIST DOCUMENTED IN MEDICAL RECORD: ICD-10-PCS | Mod: CPTII,S$GLB,, | Performed by: INTERNAL MEDICINE

## 2022-10-04 PROCEDURE — 90471 IMMUNIZATION ADMIN: CPT | Mod: S$GLB,,, | Performed by: INTERNAL MEDICINE

## 2022-10-04 PROCEDURE — 3008F PR BODY MASS INDEX (BMI) DOCUMENTED: ICD-10-PCS | Mod: CPTII,S$GLB,, | Performed by: INTERNAL MEDICINE

## 2022-10-04 PROCEDURE — 3079F PR MOST RECENT DIASTOLIC BLOOD PRESSURE 80-89 MM HG: ICD-10-PCS | Mod: CPTII,S$GLB,, | Performed by: INTERNAL MEDICINE

## 2022-10-04 PROCEDURE — 90472 IMMUNIZATION ADMIN EACH ADD: CPT | Mod: S$GLB,,, | Performed by: INTERNAL MEDICINE

## 2022-10-04 PROCEDURE — 99204 PR OFFICE/OUTPT VISIT, NEW, LEVL IV, 45-59 MIN: ICD-10-PCS | Mod: 25,S$GLB,, | Performed by: INTERNAL MEDICINE

## 2022-10-04 PROCEDURE — 90715 TDAP VACCINE 7 YRS/> IM: CPT | Mod: S$GLB,,, | Performed by: INTERNAL MEDICINE

## 2022-10-04 PROCEDURE — 90472 TDAP VACCINE GREATER THAN OR EQUAL TO 7YO IM: ICD-10-PCS | Mod: S$GLB,,, | Performed by: INTERNAL MEDICINE

## 2022-10-04 PROCEDURE — 99999 PR PBB SHADOW E&M-EST. PATIENT-LVL V: ICD-10-PCS | Mod: PBBFAC,,, | Performed by: INTERNAL MEDICINE

## 2022-10-04 PROCEDURE — 90715 TDAP VACCINE GREATER THAN OR EQUAL TO 7YO IM: ICD-10-PCS | Mod: S$GLB,,, | Performed by: INTERNAL MEDICINE

## 2022-10-04 PROCEDURE — 3079F DIAST BP 80-89 MM HG: CPT | Mod: CPTII,S$GLB,, | Performed by: INTERNAL MEDICINE

## 2022-10-04 PROCEDURE — 3075F SYST BP GE 130 - 139MM HG: CPT | Mod: CPTII,S$GLB,, | Performed by: INTERNAL MEDICINE

## 2022-10-04 PROCEDURE — 90686 IIV4 VACC NO PRSV 0.5 ML IM: CPT | Mod: S$GLB,,, | Performed by: INTERNAL MEDICINE

## 2022-10-04 PROCEDURE — 99999 PR PBB SHADOW E&M-EST. PATIENT-LVL V: CPT | Mod: PBBFAC,,, | Performed by: INTERNAL MEDICINE

## 2022-10-04 PROCEDURE — 90686 FLU VACCINE (QUAD) GREATER THAN OR EQUAL TO 3YO PRESERVATIVE FREE IM: ICD-10-PCS | Mod: S$GLB,,, | Performed by: INTERNAL MEDICINE

## 2022-10-04 RX ORDER — IBUPROFEN 200 MG
200 TABLET ORAL EVERY 6 HOURS PRN
Status: ON HOLD | COMMUNITY
End: 2022-11-15 | Stop reason: HOSPADM

## 2022-10-04 RX ORDER — PREGABALIN 50 MG/1
50 CAPSULE ORAL 3 TIMES DAILY
Qty: 90 CAPSULE | Refills: 6 | Status: SHIPPED | OUTPATIENT
Start: 2022-10-04 | End: 2023-06-19 | Stop reason: SDUPTHER

## 2022-10-04 RX ORDER — PANTOPRAZOLE SODIUM 40 MG/1
40 TABLET, DELAYED RELEASE ORAL 2 TIMES DAILY
Qty: 60 TABLET | Refills: 0 | Status: ON HOLD | OUTPATIENT
Start: 2022-10-04 | End: 2022-11-15 | Stop reason: SDUPTHER

## 2022-10-04 RX ORDER — TIZANIDINE 4 MG/1
4 TABLET ORAL EVERY 6 HOURS PRN
Qty: 30 TABLET | Refills: 1 | Status: SHIPPED | OUTPATIENT
Start: 2022-10-04 | End: 2022-10-14

## 2022-10-04 NOTE — PROGRESS NOTES
Assessment/Plan:    Problem List Items Addressed This Visit          Neuro    Trigeminal neuralgia    Overview     -previously followed by ENT  -MRI at time of diagnosis wnl  -tried gabapentin with no relief; no other treatment attempted            Cardiac/Vascular    History of non-ST elevation myocardial infarction (NSTEMI)    Overview     -hx of chest pain and elevated troponin in 2017  -[facility] showing clean coronaries; diagnosed with coronary artery spasm  -was on calcium channel blocker but no longer taking  -denies recurrent angina            Endocrine    Morbid obesity    Overview     General weight loss/lifestyle modification strategies discussed (elicit support from others; identify saboteurs; non-food rewards, etc).  Informal exercise measures discussed, e.g. taking stairs instead of elevator.  Regular aerobic exercise program discussed.  Medication: will likely start trial of GLP1a.  Checking labs, including thyroid studies and insulin           Relevant Orders    TSH    INSULIN, RANDOM       Orthopedic    Chronic bilateral low back pain without sciatica    Overview     -chronic hx of back pain with radiculopathy  -completed conservative management, including PT, but with no improvement of symptoms  -plan to proceed with MRI  -taking NSAID multiple times daily and now with concerns about GI bleed  -instructed to stop NSAID use. Start PRN tylenol and start trial of lyrica (tried gabapentin in past but with no benefit)         RESOLVED: Inflammatory spondylopathy of lumbosacral region     Other Visit Diagnoses       Dorsalgia, unspecified    -  Primary    Relevant Medications    pregabalin (LYRICA) 50 MG capsule    tiZANidine (ZANAFLEX) 4 MG tablet    Other Relevant Orders    MRI Lumbar Spine Without Contrast    Hematochezia      -hx of intermittent episodes of hematochezia and melena  -recent episode of suspected diverticulitis; tx with abx and resolution of symptoms  -no evaluation of bleeding to date  -take  large amount of NSAIDs for back; counseled on stopping NSAID use  -start daily PPI  -EGD and c-scope ordered  -checking cbc and iron studies    Relevant Orders    CBC Without Differential    Iron and TIBC    Ferritin    Ambulatory referral/consult to Endo Procedure     Melena        Relevant Orders    Ambulatory referral/consult to Endo Procedure     Encounter for annual health examination        Relevant Orders    CBC Without Differential    Comprehensive Metabolic Panel    Lipid Panel    TSH    Encounter for hepatitis C screening test for low risk patient        Relevant Orders    Hepatitis C Antibody    Encounter for lipid screening for cardiovascular disease        Relevant Orders    Lipid Panel    Influenza vaccine needed        Relevant Orders    Influenza - Quadrivalent (PF) (Completed)    Need for Tdap vaccination        Relevant Orders    Tdap Vaccine (Completed)            Follow up in about 6 months (around 4/4/2023), or if symptoms worsen or fail to improve.    Norma Alcantar MD  ______________________________________________________________________________________________________________________________    CC: Establish care    HPI:    Patient is a new patient to me here to establish care. Patient is a 41 yo WF with a PMH of NSTEMI, coronary spasm, trigeminal neuralgia and back pain.    Previous PCP: Gary    Back pain: Pain at bilateral lower back. Present for >5 years. Sometimes with radiation up spine and down both legs. Having numbness of both legs. Worse with prolonged standing. Sometimes having weakness in legs as well. Taking ibuprofen with some benefit but having to take this daily for relief. Completed PT with no benefit. She has never had any kind of imaging. Tried gabapentin in past but with no relief. She has had no other treatment.    Melena/hematochezia: reports both bright red blood and coffee ground appearance of stool intermittently for years. Occurs spontaneously  every few months. Several months ago, presented to the ER with abdominal pain and fever. Leukocytosis on labs, CT without contrast with area suspicious for colitis. Treated with antibiotics and had resolution of symptoms. She has not had a recurrence of symptoms since then. She had never had any symptoms like this prior to this event. She has never had a colonoscopy. Again, she has been taking a large amount of NSAIDs for back pain. Twice, sometimes 3 times daily. Denies hx of heavy EtOH use.    No other new complaints today.  Remaining chronic conditions have been reviewed and remain stable. Further detail as stated above.      HM reviewed. Labs ordered. Flu and tdap vaccine today. MMG through GYN.    Past Medical History:  Past Medical History:   Diagnosis Date    Ankylosing spondylitis of unspecified sites in spine     Diverticulitis     Heart attack     Microvascular angina     Trigeminal neuralgia      Past Surgical History:   Procedure Laterality Date     SECTION  , ,     CHOLECYSTECTOMY  2002    TUBAL LIGATION       Review of patient's allergies indicates:   Allergen Reactions    Levaquin [levofloxacin]     Phenergan [promethazine]      Social History     Tobacco Use    Smoking status: Never    Smokeless tobacco: Never   Substance Use Topics    Alcohol use: Yes     Alcohol/week: 2.0 standard drinks     Types: 2 Glasses of wine per week    Drug use: Never     Family History   Problem Relation Age of Onset    Arthritis Mother     Diabetes Father     Heart disease Father 45    Hypertension Father     Cancer Paternal Aunt     Heart disease Paternal Aunt 48    Cancer Paternal Aunt     Mental retardation Paternal Aunt     Heart disease Paternal Uncle      Current Outpatient Medications on File Prior to Visit   Medication Sig Dispense Refill    ibuprofen (ADVIL,MOTRIN) 200 MG tablet Take 200 mg by mouth every 6 (six) hours as needed for Pain.      multivitamin capsule Take 1 capsule by mouth  "once daily.      [DISCONTINUED] HYDROcodone-acetaminophen (NORCO) 7.5-325 mg per tablet Take 1 tablet by mouth every 6 (six) hours as needed for Pain. 12 tablet 0    [DISCONTINUED] ondansetron (ZOFRAN-ODT) 4 MG TbDL Take 1 tablet (4 mg total) by mouth every 6 (six) hours as needed (nausea). 15 tablet 0     No current facility-administered medications on file prior to visit.       Review of Systems   Constitutional:  Negative for activity change, chills, diaphoresis, fatigue, fever and unexpected weight change.   HENT:  Negative for congestion, ear pain, hearing loss, postnasal drip, rhinorrhea, sinus pain, sore throat and trouble swallowing.    Eyes:  Negative for pain, discharge, redness and visual disturbance.   Respiratory:  Negative for cough, chest tightness, shortness of breath and wheezing.    Cardiovascular:  Negative for chest pain, palpitations and leg swelling.   Gastrointestinal:  Positive for blood in stool. Negative for abdominal pain, constipation, diarrhea, nausea and vomiting.   Endocrine: Negative for polydipsia and polyuria.   Genitourinary:  Negative for difficulty urinating, dysuria, hematuria and menstrual problem.   Musculoskeletal:  Positive for arthralgias, back pain and joint swelling. Negative for neck pain.   Skin:  Negative for rash.   Neurological:  Positive for numbness and headaches. Negative for dizziness, syncope and weakness.   Psychiatric/Behavioral:  Negative for confusion and dysphoric mood. The patient is not nervous/anxious.      Vitals:    10/04/22 1342   BP: 132/84   Pulse: 99   Temp: 98.2 °F (36.8 °C)   SpO2: 100%   Weight: (!) 136.4 kg (300 lb 9.6 oz)   Height: 5' 7" (1.702 m)       Wt Readings from Last 3 Encounters:   10/04/22 (!) 136.4 kg (300 lb 9.6 oz)   05/27/22 (!) 138.7 kg (305 lb 12.5 oz)       Physical Exam  Constitutional:       General: She is not in acute distress.     Appearance: Normal appearance. She is well-developed. She is obese.   HENT:      Head: " Normocephalic and atraumatic.   Eyes:      Conjunctiva/sclera: Conjunctivae normal.   Cardiovascular:      Rate and Rhythm: Normal rate and regular rhythm.      Pulses: Normal pulses.      Heart sounds: Normal heart sounds. No murmur heard.  Pulmonary:      Effort: Pulmonary effort is normal. No respiratory distress.      Breath sounds: Normal breath sounds.   Abdominal:      General: Bowel sounds are normal. There is no distension.      Palpations: Abdomen is soft.      Tenderness: There is no abdominal tenderness.   Musculoskeletal:         General: Normal range of motion.      Cervical back: Normal range of motion and neck supple.   Skin:     General: Skin is warm and dry.      Findings: No rash.   Neurological:      General: No focal deficit present.      Mental Status: She is alert and oriented to person, place, and time.      Sensory: Sensory deficit present.      Motor: No weakness.      Gait: Gait normal.      Comments: Numbness of bilateral anterior thighs   Psychiatric:         Mood and Affect: Mood normal.         Behavior: Behavior normal.       Health Maintenance   Topic Date Due    Hepatitis C Screening  Never done    Lipid Panel  Never done    Mammogram  Never done    High Dose Statin  Never done    TETANUS VACCINE  10/04/2032

## 2022-10-04 NOTE — Clinical Note
Please let patient know that I sent in a prescription for protonix twice daily until she can have endoscopy some. This is to lower acid in case she does have some kind of ulcer

## 2022-10-05 ENCOUNTER — TELEPHONE (OUTPATIENT)
Dept: FAMILY MEDICINE | Facility: CLINIC | Age: 42
End: 2022-10-05
Payer: COMMERCIAL

## 2022-10-05 NOTE — TELEPHONE ENCOUNTER
----- Message from Norma Alcantar MD sent at 10/4/2022  3:24 PM CDT -----  Please let patient know that I sent in a prescription for protonix twice daily until she can have endoscopy some. This is to lower acid in case she does have some kind of ulcer

## 2022-10-07 ENCOUNTER — HOSPITAL ENCOUNTER (OUTPATIENT)
Dept: PREADMISSION TESTING | Facility: HOSPITAL | Age: 42
Discharge: HOME | End: 2022-10-07
Attending: INTERNAL MEDICINE
Payer: COMMERCIAL

## 2022-10-07 DIAGNOSIS — K92.1 MELENA: ICD-10-CM

## 2022-10-07 DIAGNOSIS — K92.1 HEMATOCHEZIA: Primary | ICD-10-CM

## 2022-10-07 RX ORDER — POLYETHYLENE GLYCOL 3350, SODIUM SULFATE ANHYDROUS, SODIUM BICARBONATE, SODIUM CHLORIDE, POTASSIUM CHLORIDE 236; 22.74; 6.74; 5.86; 2.97 G/4L; G/4L; G/4L; G/4L; G/4L
4 POWDER, FOR SOLUTION ORAL ONCE
Qty: 4000 ML | Refills: 0 | Status: SHIPPED | OUTPATIENT
Start: 2022-10-07 | End: 2022-10-07

## 2022-10-12 DIAGNOSIS — Z12.31 OTHER SCREENING MAMMOGRAM: ICD-10-CM

## 2022-10-17 ENCOUNTER — HOSPITAL ENCOUNTER (OUTPATIENT)
Dept: RADIOLOGY | Facility: HOSPITAL | Age: 42
Discharge: HOME | End: 2022-10-17
Attending: INTERNAL MEDICINE
Payer: COMMERCIAL

## 2022-10-17 DIAGNOSIS — M54.9 DORSALGIA, UNSPECIFIED: ICD-10-CM

## 2022-10-17 PROCEDURE — 72148 MRI LUMBAR SPINE W/O DYE: CPT | Mod: 26,,, | Performed by: RADIOLOGY

## 2022-10-17 PROCEDURE — 72148 MRI LUMBAR SPINE W/O DYE: CPT | Mod: TC,PO

## 2022-10-17 PROCEDURE — 72148 MRI LUMBAR SPINE WITHOUT CONTRAST: ICD-10-PCS | Mod: 26,,, | Performed by: RADIOLOGY

## 2022-10-21 ENCOUNTER — HOSPITAL ENCOUNTER (OUTPATIENT)
Dept: RADIOLOGY | Facility: HOSPITAL | Age: 42
Discharge: HOME | End: 2022-10-21
Attending: INTERNAL MEDICINE
Payer: COMMERCIAL

## 2022-10-21 VITALS — WEIGHT: 293 LBS | HEIGHT: 67 IN | BODY MASS INDEX: 45.99 KG/M2

## 2022-10-21 DIAGNOSIS — M54.50 CHRONIC BILATERAL LOW BACK PAIN WITHOUT SCIATICA: Primary | ICD-10-CM

## 2022-10-21 DIAGNOSIS — G89.29 CHRONIC BILATERAL LOW BACK PAIN WITHOUT SCIATICA: Primary | ICD-10-CM

## 2022-10-21 DIAGNOSIS — Z12.31 OTHER SCREENING MAMMOGRAM: ICD-10-CM

## 2022-10-21 PROCEDURE — 77063 BREAST TOMOSYNTHESIS BI: CPT | Mod: TC,PO

## 2022-10-21 PROCEDURE — 77067 SCR MAMMO BI INCL CAD: CPT | Mod: 26,,, | Performed by: RADIOLOGY

## 2022-10-21 PROCEDURE — 77067 MAMMO DIGITAL SCREENING BILAT WITH TOMO: ICD-10-PCS | Mod: 26,,, | Performed by: RADIOLOGY

## 2022-10-21 PROCEDURE — 77063 MAMMO DIGITAL SCREENING BILAT WITH TOMO: ICD-10-PCS | Mod: 26,,, | Performed by: RADIOLOGY

## 2022-10-21 PROCEDURE — 77063 BREAST TOMOSYNTHESIS BI: CPT | Mod: 26,,, | Performed by: RADIOLOGY

## 2022-10-21 PROCEDURE — 77067 SCR MAMMO BI INCL CAD: CPT | Mod: TC,PO

## 2022-10-21 NOTE — PROGRESS NOTES
Results have been released via [facility]. Please verify that these have been viewed by patient. If not, please call patient with results.    Please schedule the following orders:  Back and spine clinic    I have sent a message to them with the following interpretation (see below).    I have reviewed your recent results.    Your MRI does show degenerative changes of the spine. I have sent in a referral to our back and spine clinic to discuss treatment options.    Please do not hesitate to call or message with any additional questions or concerns.    Norma Alcantar MD

## 2022-10-24 NOTE — PROGRESS NOTES
Normal mammogram, repeat in 1 year, results released through [facility]. Please verify that patient has viewed results. If not, please call patient with interpretation below:    I have reviewed the results of your mammogram and it appears that everything was read as normal.  Based on this, the radiologist has recommended that you recheck a mammogram in 1 year.     Also please see below health maintenance items that are due:    COVID-19 Vaccine(1) Never done  HIV Screening Never done  High Dose Statin Never done

## 2022-10-28 ENCOUNTER — OFFICE VISIT (OUTPATIENT)
Dept: PAIN MEDICINE | Facility: CLINIC | Age: 42
End: 2022-10-28
Payer: COMMERCIAL

## 2022-10-28 VITALS
DIASTOLIC BLOOD PRESSURE: 91 MMHG | HEIGHT: 67 IN | HEART RATE: 65 BPM | BODY MASS INDEX: 45.99 KG/M2 | SYSTOLIC BLOOD PRESSURE: 137 MMHG | WEIGHT: 293 LBS

## 2022-10-28 DIAGNOSIS — M54.50 CHRONIC BILATERAL LOW BACK PAIN WITHOUT SCIATICA: ICD-10-CM

## 2022-10-28 DIAGNOSIS — G89.29 CHRONIC BILATERAL LOW BACK PAIN WITHOUT SCIATICA: ICD-10-CM

## 2022-10-28 PROCEDURE — 1160F PR REVIEW ALL MEDS BY PRESCRIBER/CLIN PHARMACIST DOCUMENTED: ICD-10-PCS | Mod: CPTII,S$GLB,, | Performed by: PHYSICIAN ASSISTANT

## 2022-10-28 PROCEDURE — 1159F PR MEDICATION LIST DOCUMENTED IN MEDICAL RECORD: ICD-10-PCS | Mod: CPTII,S$GLB,, | Performed by: PHYSICIAN ASSISTANT

## 2022-10-28 PROCEDURE — 3075F PR MOST RECENT SYSTOLIC BLOOD PRESS GE 130-139MM HG: ICD-10-PCS | Mod: CPTII,S$GLB,, | Performed by: PHYSICIAN ASSISTANT

## 2022-10-28 PROCEDURE — 3075F SYST BP GE 130 - 139MM HG: CPT | Mod: CPTII,S$GLB,, | Performed by: PHYSICIAN ASSISTANT

## 2022-10-28 PROCEDURE — 3080F PR MOST RECENT DIASTOLIC BLOOD PRESSURE >= 90 MM HG: ICD-10-PCS | Mod: CPTII,S$GLB,, | Performed by: PHYSICIAN ASSISTANT

## 2022-10-28 PROCEDURE — 1159F MED LIST DOCD IN RCRD: CPT | Mod: CPTII,S$GLB,, | Performed by: PHYSICIAN ASSISTANT

## 2022-10-28 PROCEDURE — 99204 PR OFFICE/OUTPT VISIT, NEW, LEVL IV, 45-59 MIN: ICD-10-PCS | Mod: S$GLB,,, | Performed by: PHYSICIAN ASSISTANT

## 2022-10-28 PROCEDURE — 1160F RVW MEDS BY RX/DR IN RCRD: CPT | Mod: CPTII,S$GLB,, | Performed by: PHYSICIAN ASSISTANT

## 2022-10-28 PROCEDURE — 99999 PR PBB SHADOW E&M-EST. PATIENT-LVL IV: ICD-10-PCS | Mod: PBBFAC,,, | Performed by: PHYSICIAN ASSISTANT

## 2022-10-28 PROCEDURE — 99999 PR PBB SHADOW E&M-EST. PATIENT-LVL IV: CPT | Mod: PBBFAC,,, | Performed by: PHYSICIAN ASSISTANT

## 2022-10-28 PROCEDURE — 3080F DIAST BP >= 90 MM HG: CPT | Mod: CPTII,S$GLB,, | Performed by: PHYSICIAN ASSISTANT

## 2022-10-28 PROCEDURE — 99204 OFFICE O/P NEW MOD 45 MIN: CPT | Mod: S$GLB,,, | Performed by: PHYSICIAN ASSISTANT

## 2022-10-28 RX ORDER — TIZANIDINE 4 MG/1
4 TABLET ORAL EVERY 6 HOURS PRN
COMMUNITY
End: 2022-11-09

## 2022-10-28 NOTE — PROGRESS NOTES
Back and Spine Consult    Patient ID: Tricia Pierre is a 42 y.o. female.    Chief Complaint   Patient presents with    Low-back Pain     For 15 years and has gotten worse over the last year, constant, now both thighs are numb.       Review of Systems   Constitutional:  Negative for activity change, appetite change, chills, fever and unexpected weight change.   HENT:  Negative for tinnitus, trouble swallowing and voice change.    Respiratory:  Negative for apnea, cough, chest tightness and shortness of breath.    Cardiovascular:  Negative for chest pain and palpitations.   Gastrointestinal:  Negative for constipation, diarrhea, nausea and vomiting.   Genitourinary:  Negative for difficulty urinating, dysuria, frequency and urgency.   Musculoskeletal:  Positive for back pain and myalgias. Negative for gait problem, neck pain and neck stiffness.   Skin:  Negative for wound.   Neurological:  Positive for numbness. Negative for dizziness, tremors, seizures, facial asymmetry, speech difficulty, weakness, light-headedness and headaches.   Psychiatric/Behavioral:  Negative for confusion and decreased concentration.      Past Medical History:   Diagnosis Date    Ankylosing spondylitis of unspecified sites in spine     Diverticulitis     Heart attack     Microvascular angina     Trigeminal neuralgia      Social History     Socioeconomic History    Marital status:    Tobacco Use    Smoking status: Never    Smokeless tobacco: Never   Substance and Sexual Activity    Alcohol use: Yes     Alcohol/week: 2.0 standard drinks     Types: 2 Glasses of wine per week    Drug use: Never    Sexual activity: Yes     Partners: Male     Birth control/protection: See Surgical Hx     Family History   Problem Relation Age of Onset    Arthritis Mother     Diabetes Father     Heart disease Father 45    Hypertension Father     Cancer Paternal Aunt     Heart disease Paternal Aunt 48    Cancer Paternal Aunt     Mental retardation Paternal Aunt      "Breast cancer Paternal Aunt     Heart disease Paternal Uncle      Review of patient's allergies indicates:   Allergen Reactions    Levaquin [levofloxacin]     Phenergan [promethazine]        Current Outpatient Medications:     multivitamin capsule, Take 1 capsule by mouth once daily., Disp: , Rfl:     pantoprazole (PROTONIX) 40 MG tablet, Take 1 tablet (40 mg total) by mouth 2 (two) times daily., Disp: 60 tablet, Rfl: 0    pregabalin (LYRICA) 50 MG capsule, Take 1 capsule (50 mg total) by mouth 3 (three) times daily., Disp: 90 capsule, Rfl: 6    tiZANidine (ZANAFLEX) 4 MG tablet, Take 4 mg by mouth every 6 (six) hours as needed., Disp: , Rfl:     ibuprofen (ADVIL,MOTRIN) 200 MG tablet, Take 200 mg by mouth every 6 (six) hours as needed for Pain., Disp: , Rfl:     Vitals:    10/28/22 0805   BP: (!) 137/91   BP Location: Right arm   Patient Position: Sitting   BP Method: Large (Automatic)   Pulse: 65   Weight: (!) 137 kg (302 lb 0.5 oz)   Height: 5' 7.01" (1.702 m)       Physical Exam  Vitals and nursing note reviewed.   Constitutional:       Appearance: She is well-developed.   HENT:      Head: Normocephalic and atraumatic.   Eyes:      Pupils: Pupils are equal, round, and reactive to light.   Cardiovascular:      Rate and Rhythm: Normal rate.   Pulmonary:      Effort: Pulmonary effort is normal.   Musculoskeletal:         General: Normal range of motion.      Cervical back: Normal range of motion and neck supple.      Comments: Tender to palpation over entire axis of the spine.   Skin:     General: Skin is warm and dry.   Neurological:      Mental Status: She is alert and oriented to person, place, and time.      Coordination: Finger-Nose-Finger Test normal.      Gait: Gait is intact. Tandem walk normal.      Deep Tendon Reflexes:      Reflex Scores:       Tricep reflexes are 2+ on the right side and 2+ on the left side.       Bicep reflexes are 2+ on the right side and 2+ on the left side.       Brachioradialis " reflexes are 2+ on the right side and 2+ on the left side.       Patellar reflexes are 2+ on the right side and 2+ on the left side.       Achilles reflexes are 2+ on the right side and 2+ on the left side.  Psychiatric:         Speech: Speech normal.         Behavior: Behavior normal.         Thought Content: Thought content normal.         Judgment: Judgment normal.       Neurologic Exam     Mental Status   Oriented to person, place, and time.   Oriented to person.   Oriented to place.   Oriented to time.   Attention: normal. Concentration: normal.   Speech: speech is normal   Level of consciousness: alert  Knowledge: consistent with education.     Cranial Nerves     CN III, IV, VI   Pupils are equal, round, and reactive to light.    CN XI   Right sternocleidomastoid strength: normal  Left sternocleidomastoid strength: normal  Right trapezius strength: normal  Left trapezius strength: normal    Motor Exam   Muscle bulk: normal  Overall muscle tone: normal  Right arm tone: normal  Left arm tone: normal  Right arm pronator drift: absent  Left arm pronator drift: absent  Right leg tone: normal  Left leg tone: normal    Strength   Right neck flexion: 5/5  Left neck flexion: 5/5  Right neck extension: 5/5  Left neck extension: 5/5  Right deltoid: 5/5  Left deltoid: 5/5  Right biceps: 5/5  Left biceps: 5/5  Right triceps: 5/5  Left triceps: 5/5  Right wrist flexion: 5/5  Left wrist flexion: 5/5  Right wrist extension: 5/5  Left wrist extension: 5/5  Right interossei: 5/5  Left interossei: 5/5  Right abdominals: 5/5  Left abdominals: 5/5  Right iliopsoas: 5/5  Left iliopsoas: 5/5  Right quadriceps: 5/5  Left quadriceps: 5/5  Right hamstrin/5  Left hamstrin/5  Right glutei: 5/5  Left glutei: 5/5  Right anterior tibial: 5/5  Left anterior tibial: 5/5  Right posterior tibial: 5/5  Left posterior tibial: 5/5  Right peroneal: 5/5  Left peroneal: 5/5  Right gastroc: 5/5  Left gastroc: 5/5    Sensory Exam   Right arm  light touch: normal  Left arm light touch: normal  Right leg light touch: normal  Left leg light touch: normal    Gait, Coordination, and Reflexes     Gait  Gait: normal    Coordination   Finger to nose coordination: normal  Tandem walking coordination: normal    Tremor   Resting tremor: absent  Intention tremor: absent  Action tremor: absent    Reflexes   Right brachioradialis: 2+  Left brachioradialis: 2+  Right biceps: 2+  Left biceps: 2+  Right triceps: 2+  Left triceps: 2+  Right patellar: 2+  Left patellar: 2+  Right achilles: 2+  Left achilles: 2+  Right Davidson: absent  Left Davidson: absent  Right ankle clonus: absent  Left ankle clonus: absent    Provider dictation:    42 year old female with obesity and trigeminal neuralgia is referred by Dr. Alcantar for back pain.  She has greater than 15 years lower lumbar back pain.  Pain is felt across the lower lumbar region and travels up to the thoracic area and base of the neck.  She has radicular pain into the anterior thighs and when standing she feels a general ache through the legs.  She has subjective numbenss in the bilateral anterior thighs.     Current medications:  lyrica, zanaflex  Medications tried:  advil, stopped due to GI irritation, bleeding  Physical therapy:  years ago without benefit  Interventional Procedures:  none     On exam, there is 5/5 strength with 2+ DTR and no sensory deficits.  Gait and station fluid.  Denies bowel/ bladder dysfunction.  Full range of motion of the upper and lower extremities. No pain with axial facet loading.  No SI joint pain on palpation.  No pain with lumbar flexion/ extension.  Tender to palpation over entire axis of the spine.    MRI lumbar spine from 10-17-22 reveiwed.  There is good alignment of the bones.  Disk space narrowing at L5/S1.  Mild degenerative chagnes throughout.  No significant central canal or foraminal narrowing noted    Ms. Clarke has chronic axial back pain with some radicular leg pain in a non  focal dermatomal distribution.  No significant abnormalities on imaging.  Pain pattern, exam and imaging most consistent with myofascial, mechanical pain.  Possible meralgia paresthetica as well.  I recommend healthy back TP< regular exercise, weightloss, good posture.  She understands and would like to pursue HB PT.  Follow up as needed.       Visit Diagnosis:  Chronic bilateral low back pain without sciatica  -     Ambulatory referral[facility]ult to [facility]  -     Ambulatory referral/consult to [facility]; Future; Expected date: 11/04/2022      Total time spent counseling greater than fifty percent of total visit time.  Counseling included discussion regarding imaging findings, diagnosis possibilities, treatment options, risks and benefits.   The patient had many questions regarding the options and long-term effects.

## 2022-11-10 NOTE — PROGRESS NOTES
Procedure instructions reviewed. Place, time, begin low fiber diet, clear liquids only on day before procedure no solid food at all, nothing to eat or drink after 6 am dose of prep on am of procedure, no chewing gum or sucking on candy, do not take any medication on day of procedure, have someone to drive them home and bowel prep instructions reviewed with pt. Pt verbalized understanding.

## 2022-11-15 ENCOUNTER — ANESTHESIA (OUTPATIENT)
Dept: ENDOSCOPY | Facility: HOSPITAL | Age: 42
End: 2022-11-15
Payer: COMMERCIAL

## 2022-11-15 ENCOUNTER — HOSPITAL ENCOUNTER (OUTPATIENT)
Facility: HOSPITAL | Age: 42
Discharge: HOME | End: 2022-11-15
Attending: FAMILY MEDICINE | Admitting: FAMILY MEDICINE
Payer: COMMERCIAL

## 2022-11-15 ENCOUNTER — ANESTHESIA EVENT (OUTPATIENT)
Dept: ENDOSCOPY | Facility: HOSPITAL | Age: 42
End: 2022-11-15
Payer: COMMERCIAL

## 2022-11-15 VITALS
TEMPERATURE: 98 F | HEIGHT: 67 IN | SYSTOLIC BLOOD PRESSURE: 131 MMHG | OXYGEN SATURATION: 99 % | BODY MASS INDEX: 45.99 KG/M2 | WEIGHT: 293 LBS | DIASTOLIC BLOOD PRESSURE: 89 MMHG | RESPIRATION RATE: 18 BRPM | HEART RATE: 73 BPM

## 2022-11-15 DIAGNOSIS — K27.9 PUD (PEPTIC ULCER DISEASE): ICD-10-CM

## 2022-11-15 DIAGNOSIS — K92.1 HEMATOCHEZIA: ICD-10-CM

## 2022-11-15 DIAGNOSIS — K92.1 MELENA: Primary | ICD-10-CM

## 2022-11-15 DIAGNOSIS — E66.01 MORBID OBESITY: Primary | ICD-10-CM

## 2022-11-15 DIAGNOSIS — K62.6 RECTAL ULCER: ICD-10-CM

## 2022-11-15 LAB
B-HCG UR QL: NEGATIVE
CTP QC/QA: YES

## 2022-11-15 PROCEDURE — 88305 TISSUE EXAM BY PATHOLOGIST: CPT | Mod: 59 | Performed by: PATHOLOGY

## 2022-11-15 PROCEDURE — 45380 COLONOSCOPY AND BIOPSY: CPT | Performed by: FAMILY MEDICINE

## 2022-11-15 PROCEDURE — 81025 URINE PREGNANCY TEST: CPT | Performed by: FAMILY MEDICINE

## 2022-11-15 PROCEDURE — 88342 IMHCHEM/IMCYTCHM 1ST ANTB: CPT | Performed by: PATHOLOGY

## 2022-11-15 PROCEDURE — 88305 TISSUE EXAM BY PATHOLOGIST: ICD-10-PCS | Mod: 26,,, | Performed by: PATHOLOGY

## 2022-11-15 PROCEDURE — 25000003 PHARM REV CODE 250: Performed by: NURSE ANESTHETIST, CERTIFIED REGISTERED

## 2022-11-15 PROCEDURE — 43239 EGD BIOPSY SINGLE/MULTIPLE: CPT | Performed by: FAMILY MEDICINE

## 2022-11-15 PROCEDURE — 63600175 PHARM REV CODE 636 W HCPCS: Performed by: NURSE ANESTHETIST, CERTIFIED REGISTERED

## 2022-11-15 PROCEDURE — 43239 EGD BIOPSY SINGLE/MULTIPLE: CPT | Mod: 51,,, | Performed by: FAMILY MEDICINE

## 2022-11-15 PROCEDURE — 45380 PR COLONOSCOPY,BIOPSY: ICD-10-PCS | Mod: ,,, | Performed by: FAMILY MEDICINE

## 2022-11-15 PROCEDURE — 88305 TISSUE EXAM BY PATHOLOGIST: CPT | Mod: 26,,, | Performed by: PATHOLOGY

## 2022-11-15 PROCEDURE — 45380 COLONOSCOPY AND BIOPSY: CPT | Mod: ,,, | Performed by: FAMILY MEDICINE

## 2022-11-15 PROCEDURE — 37000009 HC ANESTHESIA EA ADD 15 MINS: Performed by: FAMILY MEDICINE

## 2022-11-15 PROCEDURE — 88342 IMHCHEM/IMCYTCHM 1ST ANTB: CPT | Mod: 26,,, | Performed by: PATHOLOGY

## 2022-11-15 PROCEDURE — 88342 CHG IMMUNOCYTOCHEMISTRY: ICD-10-PCS | Mod: 26,,, | Performed by: PATHOLOGY

## 2022-11-15 PROCEDURE — 37000008 HC ANESTHESIA 1ST 15 MINUTES: Performed by: FAMILY MEDICINE

## 2022-11-15 PROCEDURE — 43239 PR EGD, FLEX, W/BIOPSY, SGL/MULTI: ICD-10-PCS | Mod: 51,,, | Performed by: FAMILY MEDICINE

## 2022-11-15 PROCEDURE — 27201012 HC FORCEPS, HOT/COLD, DISP: Performed by: FAMILY MEDICINE

## 2022-11-15 RX ORDER — SODIUM CHLORIDE 9 MG/ML
INJECTION, SOLUTION INTRAVENOUS CONTINUOUS
Status: DISCONTINUED | OUTPATIENT
Start: 2022-11-15 | End: 2022-11-15 | Stop reason: HOSPADM

## 2022-11-15 RX ORDER — LIDOCAINE HYDROCHLORIDE 10 MG/ML
INJECTION, SOLUTION EPIDURAL; INFILTRATION; INTRACAUDAL; PERINEURAL
Status: DISCONTINUED | OUTPATIENT
Start: 2022-11-15 | End: 2022-11-15

## 2022-11-15 RX ORDER — SODIUM CHLORIDE, SODIUM LACTATE, POTASSIUM CHLORIDE, CALCIUM CHLORIDE 600; 310; 30; 20 MG/100ML; MG/100ML; MG/100ML; MG/100ML
INJECTION, SOLUTION INTRAVENOUS CONTINUOUS
Status: DISCONTINUED | OUTPATIENT
Start: 2022-11-15 | End: 2022-11-15 | Stop reason: HOSPADM

## 2022-11-15 RX ORDER — TIRZEPATIDE 2.5 MG/.5ML
2.5 INJECTION, SOLUTION SUBCUTANEOUS
Qty: 4 PEN | Refills: 0 | Status: SHIPPED | OUTPATIENT
Start: 2022-11-15 | End: 2022-12-07 | Stop reason: DRUGHIGH

## 2022-11-15 RX ORDER — PANTOPRAZOLE SODIUM 40 MG/1
40 TABLET, DELAYED RELEASE ORAL DAILY
Qty: 30 TABLET | Refills: 11 | Status: SHIPPED | OUTPATIENT
Start: 2022-11-15 | End: 2024-04-01

## 2022-11-15 RX ORDER — PROPOFOL 10 MG/ML
VIAL (ML) INTRAVENOUS
Status: DISCONTINUED | OUTPATIENT
Start: 2022-11-15 | End: 2022-11-15

## 2022-11-15 RX ADMIN — PROPOFOL 50 MG: 10 INJECTION, EMULSION INTRAVENOUS at 01:11

## 2022-11-15 RX ADMIN — SODIUM CHLORIDE, SODIUM LACTATE, POTASSIUM CHLORIDE, AND CALCIUM CHLORIDE: .6; .31; .03; .02 INJECTION, SOLUTION INTRAVENOUS at 01:11

## 2022-11-15 RX ADMIN — LIDOCAINE HYDROCHLORIDE 50 MG: 10 INJECTION, SOLUTION EPIDURAL; INFILTRATION; INTRACAUDAL; PERINEURAL at 01:11

## 2022-11-15 RX ADMIN — PROPOFOL 100 MG: 10 INJECTION, EMULSION INTRAVENOUS at 01:11

## 2022-11-15 NOTE — PROVATION PATIENT INSTRUCTIONS
Discharge Summary/Instructions after an Endoscopic Procedure  Patient Name: Tricia Pierre  Patient MRN: [id number]  Patient YOB: 1980  Tuesday, November 15, 2022 Corbin Brower MD  Dear patient,  As a result of recent federal legislation (The Federal Cures Act), you may   receive lab or pathology results from your procedure in your [facility]   account before your physician is able to contact you. Your physician or   their representative will relay the results to you with their   recommendations at their soonest availability.  Thank you,  RESTRICTIONS:  During your procedure today, you received medications for sedation.  These   medications may affect your judgment, balance and coordination.  Therefore,   for 24 hours, you have the following restrictions:   - DO NOT drive a car, operate machinery, make legal/financial decisions,   sign important papers or drink alcohol.    ACTIVITY:  Today: no heavy lifting, straining or running due to procedural   sedation/anesthesia.  The following day: return to full activity including work.  DIET:  Eat and drink normally unless instructed otherwise.     TREATMENT FOR COMMON SIDE EFFECTS:  - Mild abdominal pain, nausea, belching, bloating or excessive gas:  rest,   eat lightly and use a heating pad.  - Sore Throat: treat with throat lozenges and/or gargle with warm salt   water.  - Because air was used during the procedure, expelling large amounts of air   from your rectum or belching is normal.  - If a bowel prep was taken, you may not have a bowel movement for 1-3 days.    This is normal.  SYMPTOMS TO WATCH FOR AND REPORT TO YOUR PHYSICIAN:  1. Abdominal pain or bloating, other than gas cramps.  2. Chest pain.  3. Back pain.  4. Signs of infection such as: chills or fever occurring within 24 hours   after the procedure.  5. Rectal bleeding, which would show as bright red, maroon, or black stools.   (A tablespoon of blood from the rectum is not serious, especially if    hemorrhoids are present.)  6. Vomiting.  7. Weakness or dizziness.  GO DIRECTLY TO THE NEAREST EMERGENCY ROOM IF YOU HAVE ANY OF THE FOLLOWING:      Difficulty breathing              Chills and/or fever over 101 F   Persistent vomiting and/or vomiting blood   Severe abdominal pain   Severe chest pain   Black, tarry stools   Bleeding- more than one tablespoon   Any other symptom or condition that you feel may need urgent attention  Your doctor recommends these additional instructions:  If any biopsies were taken, your doctors clinic will contact you in 1 to 2   weeks with any results.  - Patient has a contact number available for emergencies.  The signs and   symptoms of potential delayed complications were discussed with the   patient.  Return to normal activities tomorrow.  Written discharge   instructions were provided to the patient.   - Resume previous diet.   - Continue present medications.   - Await pathology results.   - Repeat colonoscopy in 10 years for surveillance.   - Telephone my office for pathology results in 2 weeks.   - Discharge patient to home (via wheelchair).  For questions, problems or results please call your physician Corbin Brower MD at Work:  [phone]  If you have any questions about the above instructions, call the GI   department at [phone] or call the endoscopy unit at [phone]   from 7am until 3 pm.  [facility] [place], EMERGENCY ROOM PHONE NUMBER:   [phone]  IF A COMPLICATION OR EMERGENCY SITUATION ARISES AND YOU ARE UNABLE TO REACH   YOUR PHYSICIAN - GO DIRECTLY TO THE EMERGENCY ROOM.  I have read or have had read to me these discharge instructions for my   procedure and have received a written copy.  I understand these   instructions and will follow-up with my physician if I have any questions.     __________________________________       _____________________________________  Nurse Signature                                           Patient/Designated   Responsible Party Signature  Corbin Brower MD  11/15/2022 1:48:00 PM  This report has been verified and signed electronically.  Dear patient,  As a result of recent federal legislation (The Federal Cures Act), you may   receive lab or pathology results from your procedure in your [facility]   account before your physician is able to contact you. Your physician or   their representative will relay the results to you with their   recommendations at their soonest availability.  Thank you,  PROVATION

## 2022-11-15 NOTE — PROVATION PATIENT INSTRUCTIONS
Discharge Summary/Instructions after an Endoscopic Procedure  Patient Name: Tricia Pierre  Patient MRN: [id number]  Patient YOB: 1980  Tuesday, November 15, 2022 Corbin Brower MD  Dear patient,  As a result of recent federal legislation (The Federal Cures Act), you may   receive lab or pathology results from your procedure in your [facility]   account before your physician is able to contact you. Your physician or   their representative will relay the results to you with their   recommendations at their soonest availability.  Thank you,  RESTRICTIONS:  During your procedure today, you received medications for sedation.  These   medications may affect your judgment, balance and coordination.  Therefore,   for 24 hours, you have the following restrictions:   - DO NOT drive a car, operate machinery, make legal/financial decisions,   sign important papers or drink alcohol.    ACTIVITY:  Today: no heavy lifting, straining or running due to procedural   sedation/anesthesia.  The following day: return to full activity including work.  DIET:  Eat and drink normally unless instructed otherwise.     TREATMENT FOR COMMON SIDE EFFECTS:  - Mild abdominal pain, nausea, belching, bloating or excessive gas:  rest,   eat lightly and use a heating pad.  - Sore Throat: treat with throat lozenges and/or gargle with warm salt   water.  - Because air was used during the procedure, expelling large amounts of air   from your rectum or belching is normal.  - If a bowel prep was taken, you may not have a bowel movement for 1-3 days.    This is normal.  SYMPTOMS TO WATCH FOR AND REPORT TO YOUR PHYSICIAN:  1. Abdominal pain or bloating, other than gas cramps.  2. Chest pain.  3. Back pain.  4. Signs of infection such as: chills or fever occurring within 24 hours   after the procedure.  5. Rectal bleeding, which would show as bright red, maroon, or black stools.   (A tablespoon of blood from the rectum is not serious, especially if    hemorrhoids are present.)  6. Vomiting.  7. Weakness or dizziness.  GO DIRECTLY TO THE NEAREST EMERGENCY ROOM IF YOU HAVE ANY OF THE FOLLOWING:      Difficulty breathing              Chills and/or fever over 101 F   Persistent vomiting and/or vomiting blood   Severe abdominal pain   Severe chest pain   Black, tarry stools   Bleeding- more than one tablespoon   Any other symptom or condition that you feel may need urgent attention  Your doctor recommends these additional instructions:  If any biopsies were taken, your doctors clinic will contact you in 1 to 2   weeks with any results.  - Patient has a contact number available for emergencies.  The signs and   symptoms of potential delayed complications were discussed with the   patient.  Return to normal activities tomorrow.  Written discharge   instructions were provided to the patient.   - The patient should eat a bland diet and avoid caffeine, carbonation,   alcohol, nicotine, aspirin and NSAID's including ibuprofen and advil.    - Use Protonix (pantoprazole) 40 mg PO daily indefinitely.   - Await pathology results.   - I do not feel she needs a repeat EGD because the ulcerations appeared to   be  healing.  - Discharge patient to home (via wheelchair).  For questions, problems or results please call your physician Corbin Brower MD at Work:  [phone]  If you have any questions about the above instructions, call the GI   department at [phone] or call the endoscopy unit at [phone]   from 7am until 3 pm.  [facility] [place] [facility], EMERGENCY ROOM PHONE NUMBER:   [phone]  IF A COMPLICATION OR EMERGENCY SITUATION ARISES AND YOU ARE UNABLE TO REACH   YOUR PHYSICIAN - GO DIRECTLY TO THE EMERGENCY ROOM.  I have read or have had read to me these discharge instructions for my   procedure and have received a written copy.  I understand these   instructions and will follow-up with my physician if I have any questions.      __________________________________       _____________________________________  Nurse Signature                                          Patient/Designated   Responsible Party Signature  Corbin Brower MD  11/15/2022 1:54:37 PM  This report has been verified and signed electronically.  Dear patient,  As a result of recent federal legislation (The Federal Cures Act), you may   receive lab or pathology results from your procedure in your [facility]   account before your physician is able to contact you. Your physician or   their representative will relay the results to you with their   recommendations at their soonest availability.  Thank you,  PROVATION

## 2022-11-15 NOTE — PLAN OF CARE
MD ([facility]) at bedside.  Patient in NAD. Denies any pain or discomfort at this time.

## 2022-11-15 NOTE — ANESTHESIA POSTPROCEDURE EVALUATION
Anesthesia Post Evaluation    Patient: Tricia Pierre    Procedure(s) Performed: Procedure(s) (LRB):  EGD (ESOPHAGOGASTRODUODENOSCOPY) (N/A)  COLONOSCOPY (N/A)    Final Anesthesia Type: MAC      Patient location during evaluation: GI PACU  Patient participation: Yes- Able to Participate  Level of consciousness: awake and alert  Post-procedure vital signs: reviewed and stable  Pain management: adequate  Airway patency: patent    PONV status at discharge: No PONV  Anesthetic complications: no      Cardiovascular status: blood pressure returned to baseline  Respiratory status: unassisted and spontaneous ventilation  Hydration status: euvolemic  Follow-up not needed.          Vitals Value Taken Time   /76 11/15/22 1354   Temp 36.8 °C (98.2 °F) 11/15/22 1354   Pulse 84 11/15/22 1354   Resp 18 11/15/22 1354   SpO2 99 % 11/15/22 1354         No case tracking events are documented in the log.      Pain/Gamaliel Score: Gamaliel Score: 10 (11/15/2022  1:54 PM)

## 2022-11-15 NOTE — ANESTHESIA PREPROCEDURE EVALUATION
11/15/2022  Tricia Pierre is a 42 y.o., female.      Pre-op Assessment    I have reviewed the Patient Summary Reports.     I have reviewed the Nursing Notes. I have reviewed the NPO Status.   I have reviewed the Medications.     Review of Systems  Anesthesia Hx:  No problems with previous Anesthesia    Cardiovascular:   Past MI     Hepatic/GI:   Gi bleed   Musculoskeletal:  Spine Disorders: lumbar Chronic Pain    Endocrine:  Morbid Obesity / BMI > 40      Physical Exam  General: Well nourished    Airway:  Mallampati: II   Mouth Opening: Normal  TM Distance: Normal  Tongue: Normal  Neck ROM: Normal ROM    Dental:  Intact    Chest/Lungs:  Normal Respiratory Rate    Heart:  Rate: Normal  Rhythm: Regular Rhythm        Anesthesia Plan  Type of Anesthesia, risks & benefits discussed:    Anesthesia Type: MAC  Intra-op Monitoring Plan: Standard ASA Monitors  Post Op Pain Control Plan: multimodal analgesia  Induction:  IV  Informed Consent: Informed consent signed with the Patient and all parties understand the risks and agree with anesthesia plan.  All questions answered.   ASA Score: 3  Day of Surgery Review of History & Physical: H&P Update referred to the surgeon/provider.I have interviewed and examined the patient. I have reviewed the patient's H&P dated: There are no significant changes.     Ready For Surgery From Anesthesia Perspective.     .

## 2022-11-15 NOTE — TRANSFER OF CARE
"Anesthesia Transfer of Care Note    Patient: Tricia Pierre    Procedure(s) Performed: Procedure(s) (LRB):  EGD (ESOPHAGOGASTRODUODENOSCOPY) (N/A)  COLONOSCOPY (N/A)    Patient location: GI    Anesthesia Type: MAC    Transport from OR: Transported from OR on room air with adequate spontaneous ventilation    Post pain: adequate analgesia    Post assessment: no apparent anesthetic complications    Post vital signs: stable    Level of consciousness: sedated    Nausea/Vomiting: no nausea/vomiting    Complications: none    Transfer of care protocol was followed      Last vitals:   Visit Vitals  BP (!) 153/90 (BP Location: Left arm, Patient Position: Lying)   Pulse 90   Temp 37.1 °C (98.8 °F)   Resp 18   Ht 5' 7" (1.702 m)   Wt 136.1 kg (300 lb)   LMP 11/15/2022 (Exact Date)   SpO2 98%   Breastfeeding No   BMI 46.99 kg/m²     "

## 2022-11-15 NOTE — H&P
Short Stay Endoscopy History and Physical    PCP - Norma Alcantar MD    Procedure - EGD and colonoscopy  ASA - 2  Mallampati - per anesthesia  History of Anesthesia problems - no  Family history Anesthesia problems -  no     HPI:  This is a 42 y.o. female here for evaluation of :   Active Hospital Problems    Diagnosis  POA    *Melena [K92.1]  Yes    Hematochezia [K92.1]  Yes      Resolved Hospital Problems   No resolved problems to display.         Health Maintenance         Date Due Completion Date    COVID-19 Vaccine (1) Never done ---    HIV Screening Never done ---    High Dose Statin Never done ---    Mammogram 10/21/2023 10/21/2022    Cervical Cancer Screening 2025    TETANUS VACCINE 10/04/2032 10/4/2022            EGD  Reflux - No  Dysphagia - no  Abdominal pain - no  Diarrhea - no  Anemia - no  GI bleeding -Melena has been noted.    Colonoscopy  Screening - No  History of polyps - No    Diarrhea - no  Anemia - no  Blood in stools - yes  Abdominal pain - no  Other - no    ROS:  CONSTITUTIONAL: Denies weight change,  fatigue, fevers, chills, night sweats.  CARDIOVASCULAR: Denies chest pain, shortness of breath, orthopnea and edema.  RESPIRATORY: Denies cough, hemoptysis, dyspnea, and wheezing.  GI: See HPI.    Medical History:   Past Medical History:   Diagnosis Date    Ankylosing spondylitis of unspecified sites in spine     Diverticulitis     Heart attack     Microvascular angina     Trigeminal neuralgia        Surgical History:   Past Surgical History:   Procedure Laterality Date     SECTION  , ,     CHOLECYSTECTOMY  2002    TUBAL LIGATION         Family History:   Family History   Problem Relation Age of Onset    Arthritis Mother     Diabetes Father     Heart disease Father 45    Hypertension Father     Cancer Paternal Aunt     Heart disease Paternal Aunt 48    Cancer Paternal Aunt     Mental retardation Paternal Aunt     Breast cancer Paternal Aunt     Heart  disease Paternal Uncle     Colon cancer Neg Hx        Social History:   Social History     Tobacco Use    Smoking status: Never    Smokeless tobacco: Never   Substance Use Topics    Alcohol use: Yes     Alcohol/week: 2.0 standard drinks     Types: 2 Glasses of wine per week     Comment: rarely    Drug use: Never       Allergies:   Review of patient's allergies indicates:   Allergen Reactions    Levaquin [levofloxacin]     Phenergan [promethazine]        Medications:   No current facility-administered medications on file prior to encounter.     Current Outpatient Medications on File Prior to Encounter   Medication Sig Dispense Refill    ibuprofen (ADVIL,MOTRIN) 200 MG tablet Take 200 mg by mouth every 6 (six) hours as needed for Pain.      multivitamin capsule Take 1 capsule by mouth once daily.      pantoprazole (PROTONIX) 40 MG tablet Take 1 tablet (40 mg total) by mouth 2 (two) times daily. 60 tablet 0    pregabalin (LYRICA) 50 MG capsule Take 1 capsule (50 mg total) by mouth 3 (three) times daily. 90 capsule 6       Physical Exam:  Vital Signs:   Vitals:    11/15/22 1204   BP: (!) 153/90   Pulse: 90   Resp: 18   Temp: 98.8 °F (37.1 °C)     General Appearance: Well appearing in no acute distress  ENT: OP clear  Chest: CTA B  CV: RRR, no m/r/g  Abd: s/nt/nd/nabs  Ext: no edema    Labs:Reviewed    IMP:  Active Hospital Problems    Diagnosis  POA    *Melena [K92.1]  Yes    Hematochezia [K92.1]  Yes      Resolved Hospital Problems   No resolved problems to display.         Plan:   I have explained the risks and benefits of upper endoscopy and colonoscopy to the patient including but not limited to bleeding, perforation, infection, and death. The patient wishes to proceed.

## 2022-11-25 LAB
FINAL PATHOLOGIC DIAGNOSIS: NORMAL
GROSS: NORMAL
Lab: NORMAL

## 2022-12-06 DIAGNOSIS — E66.01 MORBID OBESITY: ICD-10-CM

## 2022-12-06 RX ORDER — TIRZEPATIDE 2.5 MG/.5ML
2.5 INJECTION, SOLUTION SUBCUTANEOUS
Qty: 4 PEN | Refills: 0 | Status: CANCELLED | OUTPATIENT
Start: 2022-12-06

## 2022-12-06 NOTE — TELEPHONE ENCOUNTER
No new care gaps identified.  [facility] Embedded Care Gaps. Reference number: [id number]. 12/06/2022   4:20:20 PM CST

## 2022-12-07 RX ORDER — TIRZEPATIDE 5 MG/.5ML
5 INJECTION, SOLUTION SUBCUTANEOUS
Qty: 4 PEN | Refills: 1 | Status: SHIPPED | OUTPATIENT
Start: 2022-12-07 | End: 2023-01-08

## 2022-12-07 NOTE — TELEPHONE ENCOUNTER
I have signed for the following orders AND/OR meds.  Please call the patient and ask the patient to schedule the testing AND/OR inform about any medications that were sent. Medications have been sent to pharmacy listed below      No orders of the defined types were placed in this encounter.      Medications Ordered This Encounter   Medications    tirzepatide (MOUNJARO) 5 mg/0.5 mL PnIj     Sig: Inject 5 mg into the skin every 7 days.     Dispense:  4 pen     Refill:  1         [facility] - [place] - [address]  [address]  [place] [address]  Phone: [phone] Fax: [phone]

## 2022-12-20 NOTE — PROGRESS NOTES
The stomach biopsies were benign.  No H. Pylori infection was noted.  This is good.   The rectal area did have small ulcers.  Let me know how the symptoms are at this time and how you are doing.

## 2023-01-27 RX ORDER — AMOXICILLIN 875 MG/1
875 TABLET, FILM COATED ORAL 2 TIMES DAILY
Qty: 14 TABLET | Refills: 0 | Status: SHIPPED | OUTPATIENT
Start: 2023-01-27 | End: 2023-02-03

## 2023-01-29 DIAGNOSIS — E66.01 MORBID OBESITY: ICD-10-CM

## 2023-01-29 RX ORDER — TIRZEPATIDE 5 MG/.5ML
5 INJECTION, SOLUTION SUBCUTANEOUS
Qty: 12 PEN | Refills: 0 | Status: CANCELLED | OUTPATIENT
Start: 2023-01-29

## 2023-01-29 NOTE — TELEPHONE ENCOUNTER
No new care gaps identified.  [facility] Embedded Care Gaps. Reference number: [id number]. 1/29/2023   5:35:36 PM CST

## 2023-01-31 NOTE — TELEPHONE ENCOUNTER
I have signed for the following orders AND/OR meds.  Please call the patient and ask the patient to schedule the testing AND/OR inform about any medications that were sent. Medications have been sent to pharmacy listed below      No orders of the defined types were placed in this encounter.      Medications Ordered This Encounter   Medications    tirzepatide 7.5 mg/0.5 mL PnIj     Sig: Inject 7.5 mg into the skin every 7 days.     Dispense:  4 pen     Refill:  1         [facility] - [place] - [address]  [address]  [place] [address]  Phone: [phone] Fax: [phone]

## 2023-03-07 DIAGNOSIS — E66.01 MORBID OBESITY: Primary | ICD-10-CM

## 2023-04-03 DIAGNOSIS — E66.01 MORBID OBESITY: ICD-10-CM

## 2023-04-04 DIAGNOSIS — E66.01 MORBID OBESITY: Primary | ICD-10-CM

## 2023-04-04 RX ORDER — TIRZEPATIDE 12.5 MG/.5ML
12.5 INJECTION, SOLUTION SUBCUTANEOUS
Qty: 4 PEN | Refills: 11 | Status: SHIPPED | OUTPATIENT
Start: 2023-04-04 | End: 2024-04-01

## 2023-04-04 NOTE — TELEPHONE ENCOUNTER
Refill Routing Note   Medication(s) are not appropriate for processing by [facility] for the following reason(s):      Required labs outdated: a1c    [facility] action(s):  Defer Labs due        Medication reconciliation completed: No     Appointments  past 12m or future 3m with PCP    Date Provider   Last Visit   10/4/2022 Norma Alcantar MD   Next Visit   Visit date not found Norma Alcantar MD   ED visits in past 90 days: 0        Note composed:11:44 AM 04/04/2023

## 2023-04-04 NOTE — TELEPHONE ENCOUNTER
No new care gaps identified.  [facility] Embedded Care Gaps. Reference number: [id number]. 4/04/2023   11:00:02 AM CDT

## 2023-06-19 DIAGNOSIS — M54.9 DORSALGIA, UNSPECIFIED: ICD-10-CM

## 2023-06-19 RX ORDER — PREGABALIN 50 MG/1
50 CAPSULE ORAL 3 TIMES DAILY
Qty: 90 CAPSULE | Refills: 11 | Status: SHIPPED | OUTPATIENT
Start: 2023-06-19 | End: 2024-04-01

## 2023-06-19 NOTE — TELEPHONE ENCOUNTER
Refill Routing Note   Medication(s) are not appropriate for processing by [facility] for the following reason(s):      Medication outside of protocol    [facility] action(s):  Route Care Due:  None identified          Appointments  past 12m or future 3m with PCP    Date Provider   Last Visit   10/4/2022 Norma Alcantar MD   Next Visit   Visit date not found Norma Alcantar MD   ED visits in past 90 days: 0        Note composed:11:22 AM 06/19/2023

## 2023-10-26 ENCOUNTER — HOSPITAL ENCOUNTER (OUTPATIENT)
Dept: RADIOLOGY | Facility: HOSPITAL | Age: 43
Discharge: HOME | End: 2023-10-26
Attending: INTERNAL MEDICINE
Payer: COMMERCIAL

## 2023-10-26 DIAGNOSIS — Z12.31 ENCOUNTER FOR SCREENING MAMMOGRAM FOR BREAST CANCER: ICD-10-CM

## 2023-10-26 PROCEDURE — 77063 MAMMO DIGITAL SCREENING BILAT WITH TOMO: ICD-10-PCS | Mod: 26,,, | Performed by: RADIOLOGY

## 2023-10-26 PROCEDURE — 77067 MAMMO DIGITAL SCREENING BILAT WITH TOMO: ICD-10-PCS | Mod: 26,,, | Performed by: RADIOLOGY

## 2023-10-26 PROCEDURE — 77067 SCR MAMMO BI INCL CAD: CPT | Mod: TC,PO

## 2023-10-26 PROCEDURE — 77067 SCR MAMMO BI INCL CAD: CPT | Mod: 26,,, | Performed by: RADIOLOGY

## 2023-10-26 PROCEDURE — 77063 BREAST TOMOSYNTHESIS BI: CPT | Mod: 26,,, | Performed by: RADIOLOGY

## 2023-10-27 NOTE — PROGRESS NOTES
Normal mammogram, repeat in 1 year, results released through [employer]. Please verify that patient has viewed results. If not, please call patient with interpretation below:    I have reviewed the results of your mammogram and it appears that everything was read as normal.  Based on this, the radiologist has recommended that you recheck a mammogram in 1 year.     Also please see below health maintenance items that are due:    COVID-19 Vaccine(1) Never done  HIV Screening Never done  High Dose Statin Never done  Hemoglobin A1c (Diabetic Prevention Screening) Never done

## 2023-12-13 NOTE — TELEPHONE ENCOUNTER
Current Outpatient Medications on File Prior to Visit   Medication Sig Dispense Refill    multivitamin capsule Take 1 capsule by mouth once daily.      pantoprazole (PROTONIX) 40 MG tablet Take 1 tablet (40 mg total) by mouth once daily. 30 tablet 11    tirzepatide (MOUNJARO) 12.5 mg/0.5 mL PnIj Inject 12.5 mg into the skin every 7 days. 4 pen 11    tiZANidine (ZANAFLEX) 4 MG tablet TAKE 1 TABLET BY MOUTH EVERY 6 HOURS AS NEEDED FOR PAIN 30 tablet 5     No current facility-administered medications on file prior to visit.

## 2024-01-31 ENCOUNTER — PATIENT OUTREACH (OUTPATIENT)
Dept: ADMINISTRATIVE | Facility: HOSPITAL | Age: 44
End: 2024-01-31
Payer: COMMERCIAL

## 2024-02-07 ENCOUNTER — PATIENT OUTREACH (OUTPATIENT)
Dept: ADMINISTRATIVE | Facility: HOSPITAL | Age: 44
End: 2024-02-07
Payer: COMMERCIAL

## 2024-02-07 ENCOUNTER — PATIENT MESSAGE (OUTPATIENT)
Dept: ADMINISTRATIVE | Facility: HOSPITAL | Age: 44
End: 2024-02-07
Payer: COMMERCIAL

## 2024-02-07 NOTE — PROGRESS NOTES
Not seen in 12 months: LVM and sent portal message to patient in regards to getting scheduled to see PCP

## 2024-04-01 ENCOUNTER — OFFICE VISIT (OUTPATIENT)
Dept: FAMILY MEDICINE | Facility: CLINIC | Age: 44
End: 2024-04-01
Payer: COMMERCIAL

## 2024-04-01 ENCOUNTER — LAB VISIT (OUTPATIENT)
Dept: LAB | Facility: HOSPITAL | Age: 44
End: 2024-04-01
Attending: INTERNAL MEDICINE
Payer: COMMERCIAL

## 2024-04-01 VITALS
DIASTOLIC BLOOD PRESSURE: 82 MMHG | SYSTOLIC BLOOD PRESSURE: 130 MMHG | HEIGHT: 67 IN | HEART RATE: 81 BPM | WEIGHT: 229.38 LBS | BODY MASS INDEX: 36 KG/M2 | RESPIRATION RATE: 18 BRPM

## 2024-04-01 DIAGNOSIS — Z13.6 ENCOUNTER FOR LIPID SCREENING FOR CARDIOVASCULAR DISEASE: ICD-10-CM

## 2024-04-01 DIAGNOSIS — Z13.1 DIABETES MELLITUS SCREENING: ICD-10-CM

## 2024-04-01 DIAGNOSIS — Z00.00 ENCOUNTER FOR ANNUAL HEALTH EXAMINATION: ICD-10-CM

## 2024-04-01 DIAGNOSIS — Z00.00 ENCOUNTER FOR ANNUAL HEALTH EXAMINATION: Primary | ICD-10-CM

## 2024-04-01 DIAGNOSIS — Z13.220 ENCOUNTER FOR LIPID SCREENING FOR CARDIOVASCULAR DISEASE: ICD-10-CM

## 2024-04-01 PROBLEM — E66.01 MORBID OBESITY: Status: RESOLVED | Noted: 2022-10-04 | Resolved: 2024-04-01

## 2024-04-01 LAB
ALBUMIN SERPL BCP-MCNC: 3 G/DL (ref 3.5–5.2)
ALP SERPL-CCNC: 42 U/L (ref 55–135)
ALT SERPL W/O P-5'-P-CCNC: 12 U/L (ref 10–44)
ANION GAP SERPL CALC-SCNC: 6 MMOL/L (ref 8–16)
AST SERPL-CCNC: 11 U/L (ref 10–40)
BASOPHILS # BLD AUTO: 0.05 K/UL (ref 0–0.2)
BASOPHILS NFR BLD: 0.7 % (ref 0–1.9)
BILIRUB SERPL-MCNC: 0.6 MG/DL (ref 0.1–1)
BUN SERPL-MCNC: 9 MG/DL (ref 6–20)
CALCIUM SERPL-MCNC: 9 MG/DL (ref 8.7–10.5)
CHLORIDE SERPL-SCNC: 111 MMOL/L (ref 95–110)
CHOLEST SERPL-MCNC: 192 MG/DL (ref 120–199)
CHOLEST/HDLC SERPL: 3.8 {RATIO} (ref 2–5)
CO2 SERPL-SCNC: 24 MMOL/L (ref 23–29)
CREAT SERPL-MCNC: 0.8 MG/DL (ref 0.5–1.4)
DIFFERENTIAL METHOD BLD: NORMAL
EOSINOPHIL # BLD AUTO: 0.3 K/UL (ref 0–0.5)
EOSINOPHIL NFR BLD: 3.5 % (ref 0–8)
ERYTHROCYTE [DISTWIDTH] IN BLOOD BY AUTOMATED COUNT: 13.7 % (ref 11.5–14.5)
EST. GFR  (NO RACE VARIABLE): >60 ML/MIN/1.73 M^2
ESTIMATED AVG GLUCOSE: 105 MG/DL (ref 68–131)
GLUCOSE SERPL-MCNC: 85 MG/DL (ref 70–110)
HBA1C MFR BLD: 5.3 % (ref 4–5.6)
HCT VFR BLD AUTO: 40.9 % (ref 37–48.5)
HDLC SERPL-MCNC: 50 MG/DL (ref 40–75)
HDLC SERPL: 26 % (ref 20–50)
HGB BLD-MCNC: 13.3 G/DL (ref 12–16)
IMM GRANULOCYTES # BLD AUTO: 0.03 K/UL (ref 0–0.04)
IMM GRANULOCYTES NFR BLD AUTO: 0.4 % (ref 0–0.5)
LDLC SERPL CALC-MCNC: 113.6 MG/DL (ref 63–159)
LYMPHOCYTES # BLD AUTO: 2.1 K/UL (ref 1–4.8)
LYMPHOCYTES NFR BLD: 29 % (ref 18–48)
MCH RBC QN AUTO: 30.4 PG (ref 27–31)
MCHC RBC AUTO-ENTMCNC: 32.5 G/DL (ref 32–36)
MCV RBC AUTO: 94 FL (ref 82–98)
MONOCYTES # BLD AUTO: 0.7 K/UL (ref 0.3–1)
MONOCYTES NFR BLD: 9.6 % (ref 4–15)
NEUTROPHILS # BLD AUTO: 4.1 K/UL (ref 1.8–7.7)
NEUTROPHILS NFR BLD: 56.8 % (ref 38–73)
NONHDLC SERPL-MCNC: 142 MG/DL
NRBC BLD-RTO: 0 /100 WBC
PLATELET # BLD AUTO: 215 K/UL (ref 150–450)
PMV BLD AUTO: 10.6 FL (ref 9.2–12.9)
POTASSIUM SERPL-SCNC: 4.7 MMOL/L (ref 3.5–5.1)
PROT SERPL-MCNC: 5.3 G/DL (ref 6–8.4)
RBC # BLD AUTO: 4.37 M/UL (ref 4–5.4)
SODIUM SERPL-SCNC: 141 MMOL/L (ref 136–145)
TRIGL SERPL-MCNC: 142 MG/DL (ref 30–150)
TSH SERPL DL<=0.005 MIU/L-ACNC: 1.26 UIU/ML (ref 0.4–4)
WBC # BLD AUTO: 7.21 K/UL (ref 3.9–12.7)

## 2024-04-01 PROCEDURE — 36415 COLL VENOUS BLD VENIPUNCTURE: CPT | Mod: PO | Performed by: INTERNAL MEDICINE

## 2024-04-01 PROCEDURE — 1159F MED LIST DOCD IN RCRD: CPT | Mod: CPTII,S$GLB,, | Performed by: INTERNAL MEDICINE

## 2024-04-01 PROCEDURE — 99999 PR PBB SHADOW E&M-EST. PATIENT-LVL III: CPT | Mod: PBBFAC,,, | Performed by: INTERNAL MEDICINE

## 2024-04-01 PROCEDURE — 84443 ASSAY THYROID STIM HORMONE: CPT | Performed by: INTERNAL MEDICINE

## 2024-04-01 PROCEDURE — 80061 LIPID PANEL: CPT | Performed by: INTERNAL MEDICINE

## 2024-04-01 PROCEDURE — 85025 COMPLETE CBC W/AUTO DIFF WBC: CPT | Performed by: INTERNAL MEDICINE

## 2024-04-01 PROCEDURE — 83036 HEMOGLOBIN GLYCOSYLATED A1C: CPT | Performed by: INTERNAL MEDICINE

## 2024-04-01 PROCEDURE — 3079F DIAST BP 80-89 MM HG: CPT | Mod: CPTII,S$GLB,, | Performed by: INTERNAL MEDICINE

## 2024-04-01 PROCEDURE — 3075F SYST BP GE 130 - 139MM HG: CPT | Mod: CPTII,S$GLB,, | Performed by: INTERNAL MEDICINE

## 2024-04-01 PROCEDURE — 3008F BODY MASS INDEX DOCD: CPT | Mod: CPTII,S$GLB,, | Performed by: INTERNAL MEDICINE

## 2024-04-01 PROCEDURE — 99396 PREV VISIT EST AGE 40-64: CPT | Mod: S$GLB,,, | Performed by: INTERNAL MEDICINE

## 2024-04-01 PROCEDURE — 80053 COMPREHEN METABOLIC PANEL: CPT | Performed by: INTERNAL MEDICINE

## 2024-04-01 RX ORDER — KETOROLAC TROMETHAMINE 10 MG/1
10 TABLET, FILM COATED ORAL EVERY 6 HOURS PRN
COMMUNITY
Start: 2024-03-30 | End: 2024-04-01

## 2024-04-01 RX ORDER — METHOCARBAMOL 500 MG/1
500 TABLET, FILM COATED ORAL
COMMUNITY
Start: 2024-03-30 | End: 2024-04-04

## 2024-04-01 NOTE — PROGRESS NOTES
This note is specifically for wellness visit performed today.       Assessment / Plan:      Patient here for annual wellness exam. Health maintenance was reviewed and ordered.    Complete history and physical was completed today.  Complete and thorough medication reconciliation was performed.  Discussed risks and benefits of medications.  Advised patient on orders and health maintenance. Continue current medications listed on your summary sheet.    All questions were answered. Patient had no further concerns. Advised of diagnoses and plan.     Problem List Items Addressed This Visit    None  Visit Diagnoses       Encounter for annual health examination    -  Primary    Relevant Orders    CBC Auto Differential    TSH    Diabetes mellitus screening        Relevant Orders    Hemoglobin A1C              Norma Alcantar MD       WELLNESS EXAM      Patient ID: Tricia Pierre is a 43 y.o. female.  has a past medical history of Ankylosing spondylitis of unspecified sites in spine, Diverticulitis, Heart attack, Microvascular angina, and Trigeminal neuralgia.     Chief Complaint:  Encounter for wellness exam    Well Adult Physical: Patient here for a comprehensive physical exam.     Health Maintenance Topics with due status: Not Due       Topic Last Completion Date    Cervical Cancer Screening 2022    TETANUS VACCINE 10/04/2022    Mammogram 10/26/2023        ==============================================    Health Maintenance Due   Topic Date Due    COVID-19 Vaccine (1) Never done    HIV Screening  Never done    High Dose Statin  Never done    Hemoglobin A1c (Diabetic Prevention Screening)  Never done       Past Medical History:  Past Medical History:   Diagnosis Date    Ankylosing spondylitis of unspecified sites in spine     Diverticulitis     Heart attack     Microvascular angina     Trigeminal neuralgia      Past Surgical History:   Procedure Laterality Date     SECTION  , ,     CHOLECYSTECTOMY   2002    COLONOSCOPY N/A 11/15/2022    Procedure: COLONOSCOPY;  Surgeon: Corbin Brower MD;  Location: [facility];  Service: Endoscopy;  Laterality: N/A;    ESOPHAGOGASTRODUODENOSCOPY N/A 11/15/2022    Procedure: EGD (ESOPHAGOGASTRODUODENOSCOPY);  Surgeon: Corbin Brower MD;  Location: [facility];  Service: Endoscopy;  Laterality: N/A;    TUBAL LIGATION  2010     Review of patient's allergies indicates:   Allergen Reactions    Levaquin [levofloxacin]     Phenergan [promethazine]      Current Outpatient Medications on File Prior to Visit   Medication Sig Dispense Refill    ketorolac (TORADOL) 10 mg tablet Take 10 mg by mouth every 6 (six) hours as needed for Pain.      methocarbamoL (ROBAXIN) 500 MG Tab Take 500 mg by mouth.      multivitamin capsule Take 1 capsule by mouth once daily.      [DISCONTINUED] pantoprazole (PROTONIX) 40 MG tablet Take 1 tablet (40 mg total) by mouth once daily. 30 tablet 11    [DISCONTINUED] pregabalin (LYRICA) 50 MG capsule Take 1 capsule (50 mg total) by mouth 3 (three) times daily. 90 capsule 11    [DISCONTINUED] tirzepatide (MOUNJARO) 12.5 mg/0.5 mL PnIj Inject 12.5 mg into the skin every 7 days. 4 pen 11    [DISCONTINUED] tiZANidine (ZANAFLEX) 4 MG tablet TAKE 1 TABLET BY MOUTH EVERY 6 HOURS AS NEEDED FOR PAIN 30 tablet 5     No current facility-administered medications on file prior to visit.     Social History     Socioeconomic History    Marital status:    Tobacco Use    Smoking status: Never    Smokeless tobacco: Never   Substance and Sexual Activity    Alcohol use: Yes     Alcohol/week: 2.0 standard drinks of alcohol     Types: 2 Glasses of wine per week     Comment: rarely    Drug use: Never    Sexual activity: Yes     Partners: Male     Birth control/protection: See Surgical Hx     Social Determinants of Health     Financial Resource Strain: Low Risk  (3/29/2024)    Overall Financial Resource Strain (CARDIA)     Difficulty of Paying Living Expenses: Not hard at all    Food Insecurity: No Food Insecurity (3/29/2024)    Hunger Vital Sign     Worried About Running Out of Food in the Last Year: Never true     Ran Out of Food in the Last Year: Never true   Transportation Needs: No Transportation Needs (3/29/2024)    PRAPARE - Transportation     Lack of Transportation (Medical): No     Lack of Transportation (Non-Medical): No   Physical Activity: Patient Declined (3/29/2024)    Exercise Vital Sign     Days of Exercise per Week: Patient declined     Minutes of Exercise per Session: Patient declined   Stress: No Stress Concern Present (3/29/2024)    [employer] [place] [employer] - Occupational Stress Questionnaire     Feeling of Stress : Not at all   Social Connections: Unknown (3/29/2024)    Social Connection and Isolation Panel [NHANES]     Frequency of Communication with Friends and Family: More than three times a week     Frequency of Social Gatherings with Friends and Family: More than three times a week     Active Member of Clubs or Organizations: Yes     Attends Club or Organization Meetings: More than 4 times per year     Marital Status:    Housing Stability: Low Risk  (3/29/2024)    Housing Stability Vital Sign     Unable to Pay for Housing in the Last Year: No     Number of Places Lived in the Last Year: 1     Unstable Housing in the Last Year: No     Family History   Problem Relation Age of Onset    Arthritis Mother     Diabetes Father     Heart disease Father 45    Hypertension Father     Cancer Paternal Aunt     Heart disease Paternal Aunt 48    Cancer Paternal Aunt     Intellectual disability Paternal Aunt     Breast cancer Paternal Aunt     Heart disease Paternal Uncle     Colon cancer Neg Hx        Review of Systems   Constitutional:  Negative for chills, fever and malaise/fatigue.   HENT:  Negative for congestion and sore throat.    Eyes:  Negative for blurred vision and double vision.   Respiratory:  Negative for cough and shortness of breath.     Cardiovascular:  Negative for chest pain, palpitations and leg swelling.   Gastrointestinal:  Negative for abdominal pain, constipation and diarrhea.   Genitourinary:  Negative for dysuria and frequency.   Musculoskeletal:  Negative for back pain and joint pain.   Neurological:  Negative for headaches.   Psychiatric/Behavioral:  Negative for depression. The patient is not nervous/anxious.            Objective:      Vitals:    04/01/24 0736   BP: 130/82   Pulse: 81   Resp: 18     Body mass index is 35.93 kg/m².     Physical Exam  Constitutional:       General: She is not in acute distress.     Appearance: Normal appearance. She is well-developed.   HENT:      Head: Normocephalic and atraumatic.   Eyes:      Conjunctiva/sclera: Conjunctivae normal.   Cardiovascular:      Rate and Rhythm: Normal rate and regular rhythm.      Pulses: Normal pulses.      Heart sounds: Normal heart sounds. No murmur heard.  Pulmonary:      Effort: Pulmonary effort is normal. No respiratory distress.      Breath sounds: Normal breath sounds.   Abdominal:      General: Bowel sounds are normal. There is no distension.      Palpations: Abdomen is soft.      Tenderness: There is no abdominal tenderness.   Musculoskeletal:         General: Normal range of motion.      Cervical back: Normal range of motion and neck supple.   Skin:     General: Skin is warm and dry.      Findings: No rash.   Neurological:      General: No focal deficit present.      Mental Status: She is alert and oriented to person, place, and time.   Psychiatric:         Mood and Affect: Mood normal.         Behavior: Behavior normal.             All labs, imaging and procedures performed since last visit have been personally reviewed.

## 2024-04-04 NOTE — PROGRESS NOTES
Results have been released via [employer]. Please verify that these have been viewed by patient. If not, please call patient with results.      I have reviewed your recent results.    The screening A1C test was normal. This indicates that you do not currently have a diagnosis of prediabetes or diabetes.  CBC NORMAL-The CBC appears to be stable at this time with no sign of major anemia, abnormal white count or platelet abnormality.  CMP/BMP NORMAL-The electrolytes all appear stable at this time.  This includes kidney functions along with routine electrolytes like sugar, potassium and sodium.  If it was a CMP test, the liver enzymes were noted to be stable also.  LIPID NORMAL SCREEN-The cholesterol panel screening showed levels that are considered at target at this time.  Recheck each year.   TSH NORMAL-The TSH screening indicated a normal function of the thyroid.  .  Please let me know if you have any additional questions or concerns about above.

## 2025-02-27 ENCOUNTER — OFFICE VISIT (OUTPATIENT)
Dept: FAMILY MEDICINE | Facility: CLINIC | Age: 45
End: 2025-02-27
Payer: COMMERCIAL

## 2025-02-27 VITALS
WEIGHT: 257 LBS | DIASTOLIC BLOOD PRESSURE: 81 MMHG | TEMPERATURE: 98 F | HEART RATE: 87 BPM | HEIGHT: 67 IN | SYSTOLIC BLOOD PRESSURE: 130 MMHG | BODY MASS INDEX: 40.34 KG/M2

## 2025-02-27 DIAGNOSIS — M25.512 CHRONIC LEFT SHOULDER PAIN: Primary | ICD-10-CM

## 2025-02-27 DIAGNOSIS — G89.29 CHRONIC LEFT SHOULDER PAIN: Primary | ICD-10-CM

## 2025-02-27 PROCEDURE — 99999 PR PBB SHADOW E&M-EST. PATIENT-LVL IV: CPT | Mod: PBBFAC,,, | Performed by: INTERNAL MEDICINE

## 2025-02-27 PROCEDURE — 99213 OFFICE O/P EST LOW 20 MIN: CPT | Mod: S$GLB,,, | Performed by: INTERNAL MEDICINE

## 2025-02-27 PROCEDURE — 3079F DIAST BP 80-89 MM HG: CPT | Mod: CPTII,S$GLB,, | Performed by: INTERNAL MEDICINE

## 2025-02-27 PROCEDURE — 3008F BODY MASS INDEX DOCD: CPT | Mod: CPTII,S$GLB,, | Performed by: INTERNAL MEDICINE

## 2025-02-27 PROCEDURE — 3075F SYST BP GE 130 - 139MM HG: CPT | Mod: CPTII,S$GLB,, | Performed by: INTERNAL MEDICINE

## 2025-02-27 RX ORDER — IBUPROFEN 800 MG/1
800 TABLET ORAL EVERY 6 HOURS PRN
COMMUNITY
Start: 2024-12-23

## 2025-02-27 RX ORDER — TIZANIDINE 4 MG/1
4 TABLET ORAL NIGHTLY PRN
COMMUNITY
Start: 2024-12-30

## 2025-03-05 DIAGNOSIS — M25.512 LEFT SHOULDER PAIN, UNSPECIFIED CHRONICITY: Primary | ICD-10-CM

## 2025-03-10 ENCOUNTER — OFFICE VISIT (OUTPATIENT)
Dept: ORTHOPEDICS | Facility: CLINIC | Age: 45
End: 2025-03-10
Payer: COMMERCIAL

## 2025-03-10 ENCOUNTER — HOSPITAL ENCOUNTER (OUTPATIENT)
Dept: RADIOLOGY | Facility: HOSPITAL | Age: 45
Discharge: HOME | End: 2025-03-10
Attending: STUDENT IN AN ORGANIZED HEALTH CARE EDUCATION/TRAINING PROGRAM
Payer: COMMERCIAL

## 2025-03-10 VITALS — BODY MASS INDEX: 40.35 KG/M2 | WEIGHT: 257.06 LBS | HEIGHT: 67 IN

## 2025-03-10 DIAGNOSIS — M25.819 SHOULDER IMPINGEMENT: ICD-10-CM

## 2025-03-10 DIAGNOSIS — M25.512 CHRONIC LEFT SHOULDER PAIN: ICD-10-CM

## 2025-03-10 DIAGNOSIS — M67.912 TENDINOPATHY OF LEFT ROTATOR CUFF: Primary | ICD-10-CM

## 2025-03-10 DIAGNOSIS — G89.29 CHRONIC LEFT SHOULDER PAIN: ICD-10-CM

## 2025-03-10 DIAGNOSIS — M19.019 OSTEOARTHRITIS OF ACROMIOCLAVICULAR JOINT: ICD-10-CM

## 2025-03-10 DIAGNOSIS — M25.512 LEFT SHOULDER PAIN, UNSPECIFIED CHRONICITY: ICD-10-CM

## 2025-03-10 PROCEDURE — 73030 X-RAY EXAM OF SHOULDER: CPT | Mod: 26,LT,, | Performed by: RADIOLOGY

## 2025-03-10 PROCEDURE — 73030 X-RAY EXAM OF SHOULDER: CPT | Mod: TC,PO,LT

## 2025-03-10 PROCEDURE — 99999 PR PBB SHADOW E&M-EST. PATIENT-LVL III: CPT | Mod: PBBFAC,,, | Performed by: STUDENT IN AN ORGANIZED HEALTH CARE EDUCATION/TRAINING PROGRAM

## 2025-03-10 RX ORDER — TRIAMCINOLONE ACETONIDE 40 MG/ML
40 INJECTION, SUSPENSION INTRA-ARTICULAR; INTRAMUSCULAR
Status: DISCONTINUED | OUTPATIENT
Start: 2025-03-10 | End: 2025-03-10 | Stop reason: HOSPADM

## 2025-03-10 RX ADMIN — TRIAMCINOLONE ACETONIDE 40 MG: 40 INJECTION, SUSPENSION INTRA-ARTICULAR; INTRAMUSCULAR at 10:03

## 2025-03-10 NOTE — PROGRESS NOTES
Patient ID: Tricia Pierre  YOB: 1980  MRN: [id number]    Chief Complaint: Pain of the Left Shoulder      Referred By: Norma Alcantar MD    History of Present Illness: Tricia Pierre is a right-hand dominant 44 y.o. female who presents today with left shoulder pain.  Reports pain has progressively gotten worse over the last month. Reports pain to Lt shoulder that radiates to Lt neck and down Lt arm has worsened. She has tried Ibuprofen, muscle relaxers, Volteran gel at night. The pain has gotten progressively worse and it hurts worse with movement.  She describes the pain as burning and it travels down her arm with some numbness. She is right hand dominant. She denies fever, chills, redness, swelling, or any other symptoms.     The patient is active in none.  Occupation: childcare      Past Medical History:   Past Medical History:   Diagnosis Date    Ankylosing spondylitis of unspecified sites in spine     Diverticulitis     Heart attack     Microvascular angina     Trigeminal neuralgia      Past Surgical History:   Procedure Laterality Date     SECTION  , ,     CHOLECYSTECTOMY      COLONOSCOPY N/A 11/15/2022    Procedure: COLONOSCOPY;  Surgeon: Corbin Brower MD;  Location: [facility];  Service: Endoscopy;  Laterality: N/A;    ESOPHAGOGASTRODUODENOSCOPY N/A 11/15/2022    Procedure: EGD (ESOPHAGOGASTRODUODENOSCOPY);  Surgeon: Corbin Brower MD;  Location: [facility];  Service: Endoscopy;  Laterality: N/A;    TUBAL LIGATION       Family History   Problem Relation Name Age of Onset    Arthritis Mother Zoya     Diabetes Father Kamar     Heart disease Father Kamar 45    Hypertension Father Kamar     Cancer Paternal Aunt Cristin     Heart disease Paternal Aunt Cristin 48    Cancer Paternal Aunt Little     Intellectual disability Paternal Aunt Little     Breast cancer Paternal Aunt      Heart disease Paternal Uncle Cate     Colon cancer Neg Hx       Social  History[1]  Medication List with Changes/Refills   Current Medications    IBUPROFEN (ADVIL,MOTRIN) 800 MG TABLET    Take 800 mg by mouth every 6 (six) hours as needed.    MULTIVITAMIN CAPSULE    Take 1 capsule by mouth once daily.    TIZANIDINE (ZANAFLEX) 4 MG TABLET    Take 4 mg by mouth nightly as needed.     Review of patient's allergies indicates:   Allergen Reactions    Levaquin [levofloxacin]     Phenergan [promethazine]        Physical Exam:   Body mass index is 40.26 kg/m².    GENERAL: Well appearing, in no acute distress.  HEAD: Normocephalic and atraumatic.  ENT: External ears and nose grossly normal.  EYES: EOMI bilaterally  PULMONARY: Respirations are grossly even and non-labored.  NEURO: Awake, alert, and oriented x 3.  SKIN: No obvious rashes appreciated.  PSYCH: Mood & affect are appropriate.    Detailed MSK exam:     Left shoulder exam:   -ROM: abduction 120, forward flexion 120, external rotation 90, internal rotation 70  -empty can test pain but no weakness, resisted ER negative, belly press pain but no weakness  -mccray test positive, neers test negative, whipple test positive  -biceps load test negative, yerguson test negative, [place]'s test negative  -sensation intact, pulses 2+  -TTP: AC joint, lateral cuff insertion, and posterior        Imaging:  X-Ray Shoulder 2 or More Views Left  Narrative: EXAMINATION:  XR SHOULDER COMPLETE 2 OR MORE VIEWS LEFT    CLINICAL HISTORY:  Pain in left shoulder    TECHNIQUE:  Two or three views of the left shoulder were preformed.    COMPARISON:  None    FINDINGS:  No acute fracture or dislocation.  Minimal AC joint arthropathy noted.  No significant degenerative change at the glenohumeral joint.  Impression: 1.  As above    Electronically signed by: Vern Carballo DO  Date:    03/10/2025  Time:    10:14        Relevant imaging results were reviewed and interpreted by me and per my read shows mild AC arthritic changes.  This was discussed with the patient and  / or family today.     Assessment:  Tricia Pierre is a 44 y.o. female presenting with left shoulder pain.   History, physical and radiographs are consistent with a likely diagnosis of RC tendinopathy, AC OA, impingement.   Plan: Steroid injection given today (see separate procedure note for details). We discussed the proper protocols after the injection such as no submerging pools, baths tubs, or hot tubs for 24 hr.  Showering is okay today.  We also discussed that blood sugars can be elevated after an injection and asked patient to properly checked her sugars over the next few days and contact their PCP if there are any concerns.  We discussed red flags such as fevers, chills, red, warm, tender joint at the area of injection to please seek medical care immediately. Continue conservative management for pain.   Follow up as needed. All questions answered.      Tendinopathy of left rotator cuff  -     Large Joint Aspiration/Injection: L subacromial bursa    Chronic left shoulder pain  -     Ambulatory referral/consult to Orthopedics  -     Sports Medicine US - Guidance for Needle Placement    Osteoarthritis of acromioclavicular joint  -     Large Joint Aspiration/Injection: L subacromial bursa    Shoulder impingement  -     Large Joint Aspiration/Injection: L subacromial bursa         Ultrasound guidance was used for needle localization. Images were saved and stored for documentation. The appropriate structures were visualized. Dynamic visualization of the needle was continuous throughout the procedures and maintained good position.      A copy of today's visit note has been sent to the referring provider.     Electronically signed:  Jose Andersen MD, MPH  03/10/2025  10:25 AM5         [1]   Social History  Socioeconomic History    Marital status:    Tobacco Use    Smoking status: Never    Smokeless tobacco: Never   Substance and Sexual Activity    Alcohol use: Yes     Alcohol/week: 2.0 standard drinks of  alcohol     Types: 2 Glasses of wine per week     Comment: rarely    Drug use: Never    Sexual activity: Yes     Partners: Male     Birth control/protection: See Surgical Hx     Social Drivers of Health     Financial Resource Strain: Low Risk  (3/29/2024)    Overall Financial Resource Strain (CARDIA)     Difficulty of Paying Living Expenses: Not hard at all   Food Insecurity: No Food Insecurity (3/29/2024)    Hunger Vital Sign     Worried About Running Out of Food in the Last Year: Never true     Ran Out of Food in the Last Year: Never true   Transportation Needs: No Transportation Needs (3/29/2024)    PRAPARE - Transportation     Lack of Transportation (Medical): No     Lack of Transportation (Non-Medical): No   Physical Activity: Patient Declined (3/29/2024)    Exercise Vital Sign     Days of Exercise per Week: Patient declined     Minutes of Exercise per Session: Patient declined   Stress: No Stress Concern Present (3/29/2024)    [employer] [place] [employer] - Occupational Stress Questionnaire     Feeling of Stress : Not at all   Housing Stability: Low Risk  (3/29/2024)    Housing Stability Vital Sign     Unable to Pay for Housing in the Last Year: No     Number of Places Lived in the Last Year: 1     Unstable Housing in the Last Year: No

## 2025-03-10 NOTE — PROCEDURES
Large Joint Aspiration/Injection: L subacromial bursa    Date/Time: 3/10/2025 10:20 AM    Performed by: Jose Andersen MD  Authorized by: Jose Andersen MD    Consent Done?:  Yes (Verbal)  Indications:  Pain  Site marked: the procedure site was marked    Timeout: prior to procedure the correct patient, procedure, and site was verified    Prep: patient was prepped and draped in usual sterile fashion    Local anesthetic:  Bupivacaine 0.5% without epinephrine and lidocaine 1% without epinephrine    Details:  Needle Size:  21 G  Ultrasonic Guidance for needle placement?: Yes    Images are saved and documented.  Approach:  Lateral  Location:  Shoulder  Site:  L subacromial bursa  Medications:  40 mg triamcinolone acetonide 40 mg/mL  Patient tolerance:  Patient tolerated the procedure well with no immediate complications     Ultrasound guidance was used for needle localization. Images were saved and stored for documentation. The appropriate structures were visualized. Dynamic visualization of the needle was continuous throughout the procedures and maintained good position.

## 2025-03-10 NOTE — PROCEDURES
Sports Medicine US - Guidance for Needle Placement    Date/Time: 3/10/2025 10:20 AM    Performed by: Jose Andersen MD  Authorized by: Jose Andersen MD  Preparation: Patient was prepped and draped in the usual sterile fashion.  Local anesthesia used: no    Anesthesia:  Local anesthesia used: no    Sedation:  Patient sedated: no    Patient tolerance: patient tolerated the procedure well with no immediate complications  Comments: Ultrasound guidance was used for needle localization. Images were saved and stored for documentation. The appropriate structures were visualized. Dynamic visualization of the needle was continuous throughout the procedures and maintained good position.

## 2025-03-10 NOTE — PATIENT INSTRUCTIONS
Assessment:  Tricia Pierre is a 44 y.o. female   Chief Complaint   Patient presents with    Left Shoulder - Pain       Encounter Diagnosis   Name Primary?    Chronic left shoulder pain         Plan:  Ultrasound guided subacromial cortisone injection to the left shoulder  We discussed the proper protocols after the injection such as no submerging pools, baths tubs, or hot tubs for 24 hr.  Showering is okay today.  We also discussed that blood sugars can be elevated after an injection and asked patient to properly checked her sugars over the next few days and contact their PCP if there are any concerns.  We discussed red flags such as fevers, chills, red, warm, tender joint at the area of injection to please seek medical care immediately.    Apply topical diclofenac (Voltaren) up to 4 times a day to the affected area.  It can be bought over the counter at any local pharmacy.    Patient may use over the counter lidocaine patches as needed for pain.  Patient may use ice and heat as needed for pain every 2 hours for 15 minutes  Follow up as needed          Follow-up: as needed.    Thank you for choosing [facility] and Dr. Jose Andersen for your orthopedic & sports medicine care. It is our goal to provide you with exceptional care that will help keep you healthy, active, and get you back in the game.    Please do not hesitate to reach out to us via email, phone, or MyChart with any questions, concerns, or feedback.    If you felt that you received exemplary care today, please consider leaving us feedback on [employer] at:  [email or web address]    If you are experiencing pain/discomfort ,or have questions after 5pm and would like to be connected to the [facility] on-call team, please call this number and specify which Sports Medicine provider is treating you: [phone]

## 2025-07-12 NOTE — PROGRESS NOTES
Assessment/Plan:    1. Chronic left shoulder pain  -     Ambulatory referral/consult to Orthopedics; Future; Expected date: 03/06/2025        Visit today included increased complexity associated with the care of the episodic problem(s) addressed and managing the longitudinal care of the patient due to the serious and/or complex managed problem(s). See above assessment/plan.    Follow up if symptoms worsen or fail to improve.    Norma Alcantar MD  _____________________________________________________________________________________________________________________________________________________    CC: shoulder pain    Patient is in clinic today as an established patient.    History of Present Illness  The patient presents for evaluation of left shoulder pain.    She reports experiencing severe pain in a specific area of her left shoulder, which she describes as affecting the entire shoulder. She also mentions a knot in her neck. The pain intensifies when she allows her arm to hang down, necessitating constant elevation of the arm. Her daily activities are limited due to the pain, although she can move the arm if she consciously decides to do so. She recalls an incident from the previous year where she sought emergency care for similar symptoms. At that time, an x-ray was performed, but no abnormalities were detected. She was provided with a sling, which she found beneficial. She speculates that the current pain may be related to a fall she had in a parking lot at her workplace. The pain, which is intermittent, has been present for several weeks and is particularly severe upon waking in the morning. Despite taking Zanaflex nightly, a medication prescribed by her dentist for TMJ, and applying Voltaren, ibuprofen, and Salonpas patches, she reports no relief from the pain.     No other new complaints today.      No recent changes to medical/surgical history.    Medications Ordered Prior to Encounter[1]    Review of  "Systems   Constitutional:  Negative for activity change, chills, diaphoresis, fatigue, fever and unexpected weight change.   HENT:  Negative for congestion, ear pain, hearing loss, postnasal drip, rhinorrhea, sinus pain, sore throat and trouble swallowing.    Eyes:  Negative for pain, discharge, redness and visual disturbance.   Respiratory:  Negative for cough, chest tightness, shortness of breath and wheezing.    Cardiovascular:  Negative for chest pain, palpitations and leg swelling.   Gastrointestinal:  Negative for abdominal pain, blood in stool, constipation, diarrhea, nausea and vomiting.   Endocrine: Negative for polydipsia and polyuria.   Genitourinary:  Negative for difficulty urinating, dysuria, hematuria and menstrual problem.   Musculoskeletal:  Positive for arthralgias and neck pain. Negative for joint swelling.   Skin:  Negative for rash.   Neurological:  Positive for weakness. Negative for dizziness, syncope and headaches.   Psychiatric/Behavioral:  Negative for confusion and dysphoric mood. The patient is not nervous/anxious.      Vitals:    02/27/25 1449   BP: 130/81   Pulse: 87   Temp: 97.9 °F (36.6 °C)   TempSrc: Temporal   Weight: 116.6 kg (257 lb)   Height: 5' 7" (1.702 m)       Wt Readings from Last 3 Encounters:   03/10/25 116.6 kg (257 lb 0.9 oz)   02/27/25 116.6 kg (257 lb)   04/01/24 104.1 kg (229 lb 6.4 oz)       Physical Exam  Constitutional:       General: She is not in acute distress.     Appearance: Normal appearance. She is well-developed.   HENT:      Head: Normocephalic and atraumatic.   Eyes:      Conjunctiva/sclera: Conjunctivae normal.   Cardiovascular:      Rate and Rhythm: Normal rate and regular rhythm.      Pulses: Normal pulses.      Heart sounds: Normal heart sounds. No murmur heard.  Pulmonary:      Effort: Pulmonary effort is normal. No respiratory distress.      Breath sounds: Normal breath sounds.   Abdominal:      General: Bowel sounds are normal. There is no " 102 distension.      Palpations: Abdomen is soft.      Tenderness: There is no abdominal tenderness.   Musculoskeletal:         General: No swelling, tenderness or deformity. Normal range of motion.      Cervical back: Normal range of motion and neck supple.   Skin:     General: Skin is warm and dry.      Findings: No rash.   Neurological:      General: No focal deficit present.      Mental Status: She is alert and oriented to person, place, and time.   Psychiatric:         Mood and Affect: Mood normal.         Behavior: Behavior normal.     DISCLAIMER: This note was compiled by using a speech recognition dictation system and therefore please be aware that typographical / speech recognition errors can and do occur.  Please contact me if you see any errors specifically.  Consent was obtained for JERRY recording system prior to the visit.         [1]  Current Outpatient Medications on File Prior to Visit   Medication Sig Dispense Refill    ibuprofen (ADVIL,MOTRIN) 800 MG tablet Take 800 mg by mouth every 6 (six) hours as needed.      multivitamin capsule Take 1 capsule by mouth once daily.      tiZANidine (ZANAFLEX) 4 MG tablet Take 4 mg by mouth nightly as needed.       No current facility-administered medications on file prior to visit.

## 2025-08-13 ENCOUNTER — PATIENT MESSAGE (OUTPATIENT)
Dept: ADMINISTRATIVE | Facility: HOSPITAL | Age: 45
End: 2025-08-13
Payer: COMMERCIAL